# Patient Record
Sex: MALE | Race: WHITE | Employment: FULL TIME | URBAN - METROPOLITAN AREA
[De-identification: names, ages, dates, MRNs, and addresses within clinical notes are randomized per-mention and may not be internally consistent; named-entity substitution may affect disease eponyms.]

---

## 2017-05-26 ENCOUNTER — OFFICE VISIT (OUTPATIENT)
Dept: ORTHOPEDIC SURGERY | Age: 57
End: 2017-05-26

## 2017-05-26 VITALS
RESPIRATION RATE: 18 BRPM | DIASTOLIC BLOOD PRESSURE: 76 MMHG | WEIGHT: 179.2 LBS | TEMPERATURE: 98.2 F | BODY MASS INDEX: 26.46 KG/M2 | SYSTOLIC BLOOD PRESSURE: 111 MMHG

## 2017-05-26 DIAGNOSIS — Z98.1 S/P LUMBAR FUSION: ICD-10-CM

## 2017-05-26 DIAGNOSIS — M51.26 HNP (HERNIATED NUCLEUS PULPOSUS), LUMBAR: Primary | ICD-10-CM

## 2017-05-26 RX ORDER — DULOXETIN HYDROCHLORIDE 60 MG/1
CAPSULE, DELAYED RELEASE ORAL
Refills: 1 | COMMUNITY
Start: 2017-05-03

## 2017-05-26 RX ORDER — ATORVASTATIN CALCIUM 20 MG/1
TABLET, FILM COATED ORAL
Refills: 5 | COMMUNITY
Start: 2017-04-24 | End: 2022-08-04

## 2017-05-26 RX ORDER — DULOXETIN HYDROCHLORIDE 30 MG/1
CAPSULE, DELAYED RELEASE ORAL
Refills: 1 | COMMUNITY
Start: 2017-05-02 | End: 2021-06-07

## 2017-05-26 RX ORDER — TRAZODONE HYDROCHLORIDE 100 MG/1
50 TABLET ORAL
COMMUNITY
Start: 2017-05-24

## 2017-05-26 RX ORDER — ONDANSETRON 4 MG/1
TABLET, ORALLY DISINTEGRATING ORAL
Refills: 0 | COMMUNITY
Start: 2017-04-10 | End: 2022-08-04

## 2017-05-26 RX ORDER — METRONIDAZOLE 250 MG/1
TABLET ORAL
Refills: 0 | COMMUNITY
Start: 2017-04-12 | End: 2022-08-04

## 2017-05-26 RX ORDER — CIPROFLOXACIN 500 MG/1
TABLET ORAL
Refills: 0 | COMMUNITY
Start: 2017-04-12 | End: 2021-06-07

## 2017-05-26 NOTE — PROGRESS NOTES
[address]  [address], [address]  [place], [address] [place] [address] Street  Phone: [phone]  Fax: [phone]  PROGRESS NOTE  Patient: Ellis MD Saba                MRN: [id number]       SSN: [id number]  YOB: 1960        AGE: 64 y.o. SEX: male  Body mass index is 26.46 kg/(m^2). 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 He is one year out from his L4-5 fusion. He is doing quite well. He rates his pain as a 0/10. He is back working full time. His physical exam demonstrates normal strength, sensation, and reflexes. He is neurologically intact. X-rays demonstrate an L4-5 fusion. It appears to be confluent without motion. He is quite pleased with his outcome. He is going to go more full time into his martial arts activities. ASSESSMENT/PLAN: Right now, he is exercising twice a week, primarily core. I will see him back again as-needed. I have explained to him the plusses and minuses of martial arts exercise. As long as he maintains a solid core, I do not have much issue with it, but I would stay away from fighting activities and things of that sort, the key being core stability and peripheral flexibility. cc: Darion Flores M.D.          Past Medical History:   Diagnosis Date    ADD (attention deficit disorder)     Anxiety     Hypercholesteremia        Family History   Problem Relation Age of Onset    No Known Problems Mother     Diabetes Father     No Known Problems Sister     Hypertension Brother     High Cholesterol Brother     No Known Problems Brother        Current Outpatient Prescriptions   Medication Sig Dispense Refill    atorvastatin (LIPITOR) 20 mg tablet TK 1 T PO HS  5    DULoxetine (CYMBALTA) 60 mg capsule TK ONE C PO QD WITH 30MG  1    DULoxetine (CYMBALTA) 30 mg capsule TK ONE C PO QD WITH 60MG  1    VENTOLIN HFA 90 mcg/actuation inhaler       ciprofloxacin HCl (CIPRO) 500 mg tablet TK 1 T PO BID FOR 7 DAYS  0    metroNIDAZOLE (FLAGYL) 250 mg tablet TK 1 T PO TID FOR 10 DAYS  0    ondansetron (ZOFRAN ODT) 4 mg disintegrating tablet DIS 1 T ON THE TONGUE Q 6 H PRF NAUSEA  0    traZODone (DESYREL) 100 mg tablet       atorvastatin (LIPITOR) 10 mg tablet Take  by mouth daily.  fluticasone (CUTIVATE) 0.05 % topical cream TERESE A PEA-SIZED AMOUNT AA OF THE FACE D FOR 2 WEEKS  2    citalopram (CELEXA) 20 mg tablet TK 1 T PO QD  5    DULoxetine (CYMBALTA) 20 mg capsule Take 60 mg by mouth daily.  ALPRAZolam (XANAX) 0.5 mg tablet Take 0.5 mg by mouth nightly as needed. 0    AMPHETAMINE SALT COMBO 20 mg tablet Take 20 mg by mouth.  0    oxyCODONE-acetaminophen (PERCOCET 10)  mg per tablet Take 1 Tab by mouth every eight (8) hours as needed for Pain. Max Daily Amount: 3 Tabs. Indications: PAIN 90 Tab 0    HYDROmorphone (DILAUDID) 4 mg tablet Take 1 Tab by mouth every six (6) hours as needed for Pain. Max Daily Amount: 16 mg. 60 Tab 0    oxyCODONE-acetaminophen (PERCOCET 10)  mg per tablet Take 1 Tab by mouth every six (6) hours as needed. Max Daily Amount: 4 Tabs. 40 Tab 0    gabapentin (NEURONTIN) 300 mg capsule Take 1 Cap by mouth three (3) times daily. Indications: NEUROPATHIC PAIN 90 Cap 3    SUMAtriptan (IMITREX) 50 mg tablet Take 50 mg by mouth once as needed.  CLARITIN-D 12 HOUR 5-120 mg per tablet Take 1 Tab by mouth daily. 3    VYVANSE 40 mg capsule Take 80 mg by mouth daily. 0    FLUoxetine (PROZAC) 20 mg tablet Take 20 mg by mouth daily. 6    DOC-Q-LACE 100 mg capsule Take 100 mg by mouth.  0    Cyclobenzaprine 7.5 mg tablet Take 7.5 mg by mouth.  0    MIRVASO 0.33 % gel   0    simvastatin (ZOCOR) 40 mg tablet Take 40 mg by mouth nightly.          No Known Allergies    Past Surgical History:   Procedure Laterality Date    ABDOMEN SURGERY PROC UNLISTED      HX CARPAL TUNNEL RELEASE      bilateral    HX HERNIA REPAIR  1996    inguinal hernia repair    HX LUMBAR DISKECTOMY  10/19/15    gregg,     HX LUMBAR LAMINECTOMY  10/19/15    JACQUELINE DUDLEY    HX LUMBAR LAMINECTOMY  05/25/2016    HX MOHS PROCEDURES      bilateral 2x on the right, 1x on the left    SINUS SURGERY PROC UNLISTED  1998       Past Medical History:   Diagnosis Date    ADD (attention deficit disorder)     Anxiety     Hypercholesteremia        Social History     Social History    Marital status:      Spouse name: N/A    Number of children: N/A    Years of education: N/A     Occupational History    ENT Physician      [employer]     Social History Main Topics    Smoking status: Never Smoker    Smokeless tobacco: Never Used    Alcohol use No    Drug use: No    Sexual activity: Not on file     Other Topics Concern    Not on file     Social History Narrative         REVIEW OF SYSTEMS:   CONSTITUTIONAL SYMPTOMS:  Negative. EYES:  Negative. EARS, NOSE, THROAT AND MOUTH:  Negative. CARDIOVASCULAR:  Negative. RESPIRATORY:  Negative. GENITOURINARY: Negative. GASTROINTESTINAL:  Negative. INTEGUMENTARY (SKIN AND/OR BREAST):  Negative. MUSCULOSKELETAL: Per HPI.   ENDOCRINE/RHEUMATOLOGIC:  Negative. NEUROLOGICAL:  Per HPI. HEMATOLOGIC/LYMPHATIC:  Negative. ALLERGIC/IMMUNOLOGIC:  Negative. PSYCHIATRIC:  Negative. PHYSICAL EXAMINATION:   Visit Vitals    /76 (BP 1 Location: Left arm, BP Patient Position: Sitting)    Temp 98.2 °F (36.8 °C) (Oral)    Resp 18    Wt 179 lb 3.2 oz (81.3 kg)    BMI 26.46 kg/m2    PAIN SCALE: 0 - No pain/10    CONSTITUTIONAL: The patient is in no apparent distress and is alert and oriented x 3.. HEENT: Normocephalic. Hearing grossly intact. NECK: Supple and symmetric. no tenderness, or masses were felt. RESPIRATORY: No labored breathing.   CARDIOVASCULAR: The carotid pulses were normal. Peripheral pulses were 2+. CHEST: Normal AP diameter and normal contour without any kyphoscoliosis. LYMPHATIC: No lymphadenopathy was appreciated in the neck, axillae or groin. SKIN:  Negative for scars, rashes, lesions, or ulcers on the right upper, right lower, left upper, left lower and trunk. NEUROLOGICAL: Alert and oriented x 3. Ambulation without assistive device. FWB. EXTREMITIES: See musculoskeletal.  MUSCULOSKELETAL:   Head and Neck:  Negative for misalignment, asymmetry, crepitation, defects, tenderness masses or effusions.  Left Upper Extremity: Inspection, percussion and palpation preformed. Alexiss sign is negative.  Right Upper Extremity: Inspection, percussion and palpation preformed. Alexiss sign is negative.  Spine, Ribs and Pelvis: Inspection, percussion and palpation preformed. Negative for misalignment, asymmetry, crepitation, defects, tenderness masses or effusions.  Left Lower Extremity: Inspection, percussion and palpation preformed. Negative straight leg raise.  Right Lower Extremity: Inspection, percussion and palpation preformed. Negative straight leg raise. SPINE EXAM:     Lumbar spine: No rash, ecchymosis, or gross obliquity. No focal atrophy is noted. ASSESSMENT    ICD-10-CM ICD-9-CM    1. HNP (herniated nucleus pulposus), lumbar M51.26 722.10 AMB POC XRAY, SPINE, LUMBOSACRAL; 2 O   2. S/P lumbar fusion Z98.1 V45.4        Written by John Bolden, as dictated by Tommye Merlin, MD.    I, Dr. Tommye Merlin, MD, confirm that all documentation is accurate.

## 2017-05-26 NOTE — PATIENT INSTRUCTIONS
Getting Back to Normal After Low Back Pain: Care Instructions  Your Care Instructions  Almost everyone has low back pain at some time. The good news is that most low back pain will go away in a few days or weeks with some basic self-care. Some people are afraid that doing too much may make their pain worse. In the past, people stayed in bed, thinking this would help their backs. Now doctors think that, in most cases, getting back to your normal activities is good for your back, as long as you avoid doing things that make your pain worse. Follow-up care is a key part of your treatment and safety. Be sure to make and go to all appointments, and call your doctor if you are having problems. It's also a good idea to know your test results and keep a list of the medicines you take. How can you care for yourself at home? Ease back into daily activities  · For the first day or two of pain, take it easy. But as soon as possible, get back to your normal daily life and activities. · Get gentle exercise, such as walking. Movement keeps your spine flexible and helps your muscles stay strong. · If you are an athlete, return to your activity carefully. Choose a low-impact option until your pain is under control. Avoid or change activities that cause pain  · Try to avoid too much bending, heavy lifting, or reaching. These movements put extra stress on your back. · In bed, try lying on your side with a pillow between your knees. Or lie on your back on the floor with a pillow under your knees. · When you sit, place a small pillow, a rolled-up towel, or a lumbar roll in the curve of your back for extra support. · Try putting one foot up on a stool or changing positions every few minutes if you have to stand still for a period of time. Pay attention to body mechanics and posture  Body mechanics are the way you use your body. Posture is the way you sit or stand. · Take extra care when you lift.  When you must lift, bend your knees and keep your back straight. Avoid twisting, and keep the load close to your body. · Stand or sit tall, with your shoulders back and your stomach pulled in to support your back. Get support when you need it  · Let people know when you need a helping hand. Get family members or friends to help out with tasks you cannot do right now. · Be honest with your doctor about how the pain affects you. · If you have had to take time off work, talk to your doctor and boss about a gradual [id number] plan. Find out if there are other ways you could do your job to avoid hurting your back again. Reduce stress  Worrying about the pain can cause you to tense the muscles in your lower back. This in turn causes more pain. Here are a few things you can do to relax your mind and your muscles:  · Take 10 to 15 minutes to sit quietly and breathe deeply. Try to focus only on your breathing. If you cannot keep thoughts away, think about things that make you feel good. · Get involved in your favorite hobby, or try something new. · Talk to a friend, read a book, or listen to your favorite music. · Find a counselor you like and trust. Talk openly and honestly about your problems. Be willing to make some changes. When should you call for help? Call 911 anytime you think you may need emergency care. For example, call if:  · You are unable to move a leg at all. Call your doctor now or seek immediate medical care if:  · You have new or worse symptoms in your legs, belly, or buttocks. Symptoms may include:  ¨ Numbness or tingling. ¨ Weakness. ¨ Pain. · You lose bladder or bowel control. Watch closely for changes in your health, and be sure to contact your doctor if:  · You are not getting better as expected. Where can you learn more? Go to http://chuck-luis eduardo.info/. Enter C908 in the search box to learn more about \"Getting Back to Normal After Low Back Pain: Care Instructions. \"  Current as of:  May 23, 2016  Content Version: 11.2  © 1142-0708 [employer]. Care instructions adapted under license by [employer] (which disclaims liability or warranty for this information). If you have questions about a medical condition or this instruction, always ask your healthcare professional. [address] any warranty or liability for your use of this information. Back Care and Preventing Injuries: Care Instructions  Your Care Instructions  You can hurt your back doing many everyday activities: lifting a heavy box, bending down to garden, exercising at the gym, and even getting out of bed. But you can keep your back strong and healthy by doing some exercises. You also can follow a few tips for sitting, sleeping, and lifting to avoid hurting your back again. Talk to your doctor before you start an exercise program. Ask for help if you want to learn more about keeping your back healthy. Follow-up care is a key part of your treatment and safety. Be sure to make and go to all appointments, and call your doctor if you are having problems. It's also a good idea to know your test results and keep a list of the medicines you take. How can you care for yourself at home? · Stay at a healthy weight to avoid strain on your lower back. · Do not smoke. Smoking increases the risk of osteoporosis, which weakens the spine. If you need help quitting, talk to your doctor about stop-smoking programs and medicines. These can increase your chances of quitting for good. · Make sure you sleep in a position that maintains your back's normal curves and on a mattress that feels comfortable. Sleep on your side with a pillow between your knees, or sleep on your back with a pillow under your knees. These positions can reduce strain on your back. · When you get out of bed, lie on your side and bend both knees. Drop your feet over the edge of the bed as you push up with both arms.  Scoot to the edge of the bed. Make sure your feet are in line with your rear end (buttocks), and then stand up. · If you must stand for a long time, put one foot on a stool, ledge, or box. Exercise to strengthen your back and other muscles  · Get at least 30 minutes of exercise on most days of the week. Walking is a good choice. You also may want to do other activities, such as running, swimming, cycling, or playing tennis or team sports. · Stretch your back muscles. Here are few exercises to try:  Catrachita Dawley on your back with your knees bent and your feet flat on the floor. Gently pull one bent knee to your chest. Put that foot back on the floor, and then pull the other knee to your chest. Hold for 15 to 30 seconds. Repeat 2 to 4 times. ¨ Do pelvic tilts. Lie on your back with your knees bent. Tighten your stomach muscles. Pull your belly button (navel) in and up toward your ribs. You should feel like your back is pressing to the floor and your hips and pelvis are slightly lifting off the floor. Hold for 6 seconds while breathing smoothly. · Keep your core muscles strong. The muscles of your back, belly (abdomen), and buttocks support your spine. ¨ Pull in your belly, and imagine pulling your navel toward your spine. Hold this for 6 seconds, then relax. Remember to keep breathing normally as you tense your muscles. ¨ Do curl-ups. Always do them with your knees bent. Keep your low back on the floor, and curl your shoulders toward your knees using a smooth, slow motion. Keep your arms folded across your chest. If this bothers your neck, try putting your hands behind your neck (not your head), with your elbows spread apart. ¨ Lie on your back with your knees bent and your feet flat on the floor. Tighten your belly muscles, and then push with your feet and raise your buttocks up a few inches. Hold this position 6 seconds as you continue to breathe normally, then lower yourself slowly to the floor. Repeat 8 to 12 times.   ¨ If you like group exercise, try Pilates or yoga. These classes have poses that strengthen the core muscles. Protect your back when you sit  · Place a small pillow, a rolled-up towel, or a lumbar roll in the curve of your back if you need extra support. · Sit in a chair that is low enough to let you place both feet flat on the floor with both knees nearly level with your hips. If your chair or desk is too high, use a foot rest to raise your knees. · When driving, keep your knees nearly level with your hips. Sit straight, and drive with both hands on the steering wheel. Your arms should be in a slightly bent position. · Try a kneeling chair, which helps tilt your hips forward. This takes pressure off your lower back. · Try sitting on an exercise ball. It can rock from side to side, which helps keep your back loose. Lift properly  · Squat down, bending at the hips and knees only. If you need to, put one knee to the floor and extend your other knee in front of you, bent at a right angle (half kneeling). · Press your chest straight forward. This helps keep your upper back straight while keeping a slight arch in your low back. · Hold the load as close to your body as possible, at the level of your navel. · Use your feet to change direction, taking small steps. · Lead with your hips as you change direction. Keep your shoulders in line with your hips as you move. Do not twist your body. · Set down your load carefully, squatting with your knees and hips only. When should you call for help? Watch closely for changes in your health, and be sure to contact your doctor if:  · You want more exercises to make your back and other core muscles stronger. Where can you learn more? Go to http://chuck-luis eduardo.info/. Enter S810 in the search box to learn more about \"Back Care and Preventing Injuries: Care Instructions. \"  Current as of: May 23, 2016  Content Version: 11.2  © 6196-4906 [employer], [facility].  Care instructions adapted under license by [employer] (which disclaims liability or warranty for this information). If you have questions about a medical condition or this instruction, always ask your healthcare professional. [address] any warranty or liability for your use of this information. Back Stretches: Exercises  Your Care Instructions  Here are some examples of exercises for stretching your back. Start each exercise slowly. Ease off the exercise if you start to have pain. Your doctor or physical therapist will tell you when you can start these exercises and which ones will work best for you. How to do the exercises  Overhead stretch    1. Stand comfortably with your feet shoulder-width apart. 2. Looking straight ahead, raise both arms over your head and reach toward the ceiling. Do not allow your head to tilt back. 3. Hold for 15 to 30 seconds, then lower your arms to your sides. 4. Repeat 2 to 4 times. Side stretch    1. Stand comfortably with your feet shoulder-width apart. 2. Raise one arm over your head, and then lean to the other side. 3. Slide your hand down your leg as you let the weight of your arm gently stretch your side muscles. Hold for 15 to 30 seconds. 4. Repeat 2 to 4 times on each side. Press-up    1. Lie on your stomach, supporting your body with your forearms. 2. Press your elbows down into the floor to raise your upper back. As you do this, relax your stomach muscles and allow your back to arch without using your back muscles. As your press up, do not let your hips or pelvis come off the floor. 3. Hold for 15 to 30 seconds, then relax. 4. Repeat 2 to 4 times. Relax and rest    1. Lie on your back with a rolled towel under your neck and a pillow under your knees. Extend your arms comfortably to your sides. 2. Relax and breathe normally. 3. Remain in this position for about 10 minutes.   4. If you can, do this 2 or 3 times each day.  Follow-up care is a key part of your treatment and safety. Be sure to make and go to all appointments, and call your doctor if you are having problems. It's also a good idea to know your test results and keep a list of the medicines you take. Where can you learn more? Go to http://chuck-luis eduardo.info/. Enter S397 in the search box to learn more about \"Back Stretches: Exercises. \"  Current as of: May 23, 2016  Content Version: 11.2  © 1655-0877 [employer]. Care instructions adapted under license by [employer] (which disclaims liability or warranty for this information). If you have questions about a medical condition or this instruction, always ask your healthcare professional. [address] any warranty or liability for your use of this information.

## 2021-06-07 ENCOUNTER — VIRTUAL VISIT (OUTPATIENT)
Dept: ORTHOPEDIC SURGERY | Age: 61
End: 2021-06-07
Payer: COMMERCIAL

## 2021-06-07 DIAGNOSIS — M25.511 ACUTE PAIN OF RIGHT SHOULDER: Primary | ICD-10-CM

## 2021-06-07 DIAGNOSIS — M25.511 RIGHT SHOULDER PAIN, UNSPECIFIED CHRONICITY: Primary | ICD-10-CM

## 2021-06-07 PROCEDURE — 99203 OFFICE O/P NEW LOW 30 MIN: CPT | Performed by: ORTHOPAEDIC SURGERY

## 2021-06-07 RX ORDER — ELETRIPTAN HYDROBROMIDE 20 MG/1
20 TABLET, FILM COATED ORAL
COMMUNITY
End: 2022-08-04

## 2021-06-07 NOTE — PROGRESS NOTES
Name: Keith Medellin    : 1960     Service Dept: [address] [place] [address] and Sports Medicine    Patient's Pharmacies:    [facility] #[id number]  [facility] [place], [address] [place] [address] AT [place] [address] [address] [address]  Phone: [phone] Fax: [phone]       Chief Complaint   Patient presents with    Shoulder Pain        There were no vitals taken for this visit. No Known Allergies   Current Outpatient Medications   Medication Sig Dispense Refill    eletriptan (RELPAX) 20 mg tablet Take 20 mg by mouth.  atorvastatin (LIPITOR) 20 mg tablet TK 1 T PO HS  5    DULoxetine (CYMBALTA) 60 mg capsule TK ONE C PO QD WITH 30MG  1    metroNIDAZOLE (FLAGYL) 250 mg tablet TK 1 T PO TID FOR 10 DAYS  0    ondansetron (ZOFRAN ODT) 4 mg disintegrating tablet DIS 1 T ON THE TONGUE Q 6 H PRF NAUSEA  0    traZODone (DESYREL) 100 mg tablet       atorvastatin (LIPITOR) 10 mg tablet Take  by mouth daily.  fluticasone (CUTIVATE) 0.05 % topical cream TERESE A PEA-SIZED AMOUNT AA OF THE FACE D FOR 2 WEEKS  2    ALPRAZolam (XANAX) 0.5 mg tablet Take 0.5 mg by mouth nightly as needed. 0    AMPHETAMINE SALT COMBO 20 mg tablet Take 20 mg by mouth.  0    oxyCODONE-acetaminophen (PERCOCET 10)  mg per tablet Take 1 Tab by mouth every eight (8) hours as needed for Pain. Max Daily Amount: 3 Tabs. Indications: PAIN 90 Tab 0    HYDROmorphone (DILAUDID) 4 mg tablet Take 1 Tab by mouth every six (6) hours as needed for Pain. Max Daily Amount: 16 mg. 60 Tab 0    oxyCODONE-acetaminophen (PERCOCET 10)  mg per tablet Take 1 Tab by mouth every six (6) hours as needed. Max Daily Amount: 4 Tabs. 40 Tab 0    gabapentin (NEURONTIN) 300 mg capsule Take 1 Cap by mouth three (3) times daily. Indications: NEUROPATHIC PAIN 90 Cap 3    VENTOLIN HFA 90 mcg/actuation inhaler       SUMAtriptan (IMITREX) 50 mg tablet Take 50 mg by mouth once as needed.       CLARITIN-D 12 HOUR 5-120 mg per tablet Take 1 Tab by mouth daily. 3    VYVANSE 40 mg capsule Take 80 mg by mouth daily. 0    FLUoxetine (PROZAC) 20 mg tablet Take 20 mg by mouth daily. 6    DOC-Q-LACE 100 mg capsule Take 100 mg by mouth.  0    MIRVASO 0.33 % gel   0    simvastatin (ZOCOR) 40 mg tablet Take 40 mg by mouth nightly. Patient Active Problem List   Diagnosis Code    HNP (herniated nucleus pulposus), lumbar M51.26    S/P lumbar laminectomy Z98.890    Swelling of lower extremity M79.89    Recurrent herniation of lumbar disc M51.26    S/P lumbar fusion Z98.1      Family History   Problem Relation Age of Onset    No Known Problems Mother     Diabetes Father     No Known Problems Sister     Hypertension Brother     High Cholesterol Brother     No Known Problems Brother       Social History     Socioeconomic History    Marital status:      Spouse name: Not on file    Number of children: Not on file    Years of education: Not on file    Highest education level: Not on file   Occupational History    Occupation: ENT Physician     Comment: [employer]   Tobacco Use    Smoking status: Never Smoker    Smokeless tobacco: Never Used   Substance and Sexual Activity    Alcohol use: No    Drug use: No     Social Determinants of Health     Financial Resource Strain:     Difficulty of Paying Living Expenses:    Food Insecurity:     Worried About Running Out of Food in the Last Year:     Ran Out of Food in the Last Year:    Transportation Needs:     Lack of Transportation (Medical):      Lack of Transportation (Non-Medical):    Physical Activity:     Days of Exercise per Week:     Minutes of Exercise per Session:    Stress:     Feeling of Stress :    Social Connections:     Frequency of Communication with Friends and Family:     Frequency of Social Gatherings with Friends and Family:     Attends Denominational Services:     Active Member of Clubs or Organizations:     Attends Club or Organization Meetings:     Marital Status:       Past Surgical History:   Procedure Laterality Date    HX CARPAL TUNNEL RELEASE      bilateral    HX HERNIA REPAIR  1996    inguinal hernia repair    HX LUMBAR DISKECTOMY  10/19/15    partial,     HX LUMBAR LAMINECTOMY  10/19/15    PARTIAL,JACQUELINE    HX LUMBAR LAMINECTOMY  05/25/2016    HX MOHS PROCEDURES      bilateral 2x on the right, 1x on the left    LA ABDOMEN SURGERY PROC UNLISTED      LA SINUS SURGERY PROC UNLISTED  1998      Past Medical History:   Diagnosis Date    ADD (attention deficit disorder)     Anxiety     Hypercholesteremia         I have reviewed and agree with [address] [place] [address] Nw and ROS and intake form in chart and the record furthermore I have reviewed prior medical record(s) regarding this patients care during this appointment. Review of Systems:   Patient is a pleasant appearing individual, appropriately dressed, well hydrated, well nourished, who is alert, appropriately oriented for age, and in no acute distress with a normal gait and normal affect who does not appear to be in any significant pain. Physical Exam:  Right Shoulder - Positive \"empty can\" test, Grossly neurovascularly intact. Range of motion-Full passive, Active with impingement. No Point tenderness, Strength-significant weakness noted with abduction, some mild crepitation, No skin lesion are identified, No instabilty is noted, No apprehension. No Swelling. Left Shoulder - grossly neurovascularly intact. Full Range of motion, No Weakness with abduction, No Point Tenderness, No skin lesion are identified, No instabilty is noted, No apprehension. No cuts or abrasions are identified. Siena Mendoza, who was evaluated through a synchronous (real-time) audio-video encounter, and/or his healthcare decision maker, is aware that it is a billable service, with coverage as determined by his insurance carrier.  He provided verbal consent to proceed: Yes, and patient identification was verified. This visit was conducted pursuant to the emergency declaration under the [facility], [address] [place] [address] and the [employer] and Major [employer] General Act. A caregiver was present when appropriate. Ability to conduct physical exam was limited. The patient was located in a state where the provider was credentialed to provide care. --Shelly Davidson MD on 6/8/2021 at 2:27 PM     Encounter Diagnoses     ICD-10-CM ICD-9-CM   1. Right shoulder pain, unspecified chronicity  M25.511 719.41       HPI:  The patient is here with a chief complaint of right shoulder pain, throbbing burning pain, progressively getting worse. Pain is 5/10. He injured in a tug of war. Assessment/Plan:  Plan at this point, I would like to go ahead and get an MRI since he has got a history of two arthroscopic surgeries on that side. I will see him back post-MRI to rule out a cuff tear and go from there. As part of continued conservative pain management options the patient was advised to utilize Tylenol or OTC NSAIDS as long as it is not medically contraindicated. Return to Office: Follow-up and Dispositions    · Return for POST MRI. Scribed by Yoly Tam LPN as dictated by [facility] BRAVO Clark MD.  Documentation True and Accepted Miguelangel Clark MD

## 2021-06-07 NOTE — PATIENT INSTRUCTIONS
Shoulder Pain: Care Instructions Your Care Instructions You can hurt your shoulder by using it too much during an activity, such as fishing or baseball. It can also happen as part of the everyday wear and tear of getting older. Shoulder injuries can be slow to heal, but your shoulder should get better with time. Your doctor may recommend a sling to rest your shoulder. If you have injured your shoulder, you may need testing and treatment. Follow-up care is a key part of your treatment and safety. Be sure to make and go to all appointments, and call your doctor if you are having problems. It's also a good idea to know your test results and keep a list of the medicines you take. How can you care for yourself at home? · Take pain medicines exactly as directed. ? If the doctor gave you a prescription medicine for pain, take it as prescribed. ? If you are not taking a prescription pain medicine, ask your doctor if you can take an over-the-counter medicine. ? Do not take two or more pain medicines at the same time unless the doctor told you to. Many pain medicines contain acetaminophen, which is Tylenol. Too much acetaminophen (Tylenol) can be harmful. · If your doctor recommends that you wear a sling, use it as directed. Do not take it off before your doctor tells you to. · Put ice or a cold pack on the sore area for 10 to 20 minutes at a time. Put a thin cloth between the ice and your skin. · If there is no swelling, you can put moist heat, a heating pad, or a warm cloth on your shoulder. Some doctors suggest alternating between hot and cold. · Rest your shoulder for a few days. If your doctor recommends it, you can then begin gentle exercise of the shoulder, but do not lift anything heavy. When should you call for help? Call 911 anytime you think you may need emergency care. For example, call if: 
  · You have chest pain or pressure. This may occur with: ? Sweating. ? Shortness of breath.  
? Nausea or vomiting. ? Pain that spreads from the chest to the neck, jaw, or one or both shoulders or arms. ? Dizziness or lightheadedness. ? A fast or uneven pulse. After calling 911, chew 1 adult-strength aspirin. Wait for an ambulance. Do not try to drive yourself.  
  · Your arm or hand is cool or pale or changes color. Call your doctor now or seek immediate medical care if: 
  · You have signs of infection, such as: 
? Increased pain, swelling, warmth, or redness in your shoulder. ? Red streaks leading from a place on your shoulder. ? Pus draining from an area of your shoulder. ? Swollen lymph nodes in your neck, armpits, or groin. ? A fever. Watch closely for changes in your health, and be sure to contact your doctor if: 
  · You cannot use your shoulder.  
  · Your shoulder does not get better as expected. Where can you learn more? Go to http://www.gray.com/ Enter W288 in the search box to learn more about \"Shoulder Pain: Care Instructions. \" Current as of: November 16, 2020               Content Version: 12.8 © 2697-5366 [employer]. Care instructions adapted under license by [employer] (which disclaims liability or warranty for this information). If you have questions about a medical condition or this instruction, always ask your healthcare professional. [place] [address] any warranty or liability for your use of this information.

## 2021-06-09 DIAGNOSIS — M25.511 ACUTE PAIN OF RIGHT SHOULDER: Primary | ICD-10-CM

## 2021-06-11 ENCOUNTER — HOSPITAL ENCOUNTER (OUTPATIENT)
Dept: MRI IMAGING | Age: 61
Discharge: HOME | End: 2021-06-11
Attending: ORTHOPAEDIC SURGERY
Payer: COMMERCIAL

## 2021-06-11 DIAGNOSIS — M25.511 ACUTE PAIN OF RIGHT SHOULDER: ICD-10-CM

## 2021-06-11 PROCEDURE — 73221 MRI JOINT UPR EXTREM W/O DYE: CPT

## 2021-11-12 ENCOUNTER — TRANSCRIBE ORDER (OUTPATIENT)
Dept: SCHEDULING | Age: 61
End: 2021-11-12

## 2021-11-12 DIAGNOSIS — N50.89 TESTICULAR MASS: Primary | ICD-10-CM

## 2021-11-17 ENCOUNTER — HOSPITAL ENCOUNTER (OUTPATIENT)
Dept: ULTRASOUND IMAGING | Age: 61
Discharge: HOME | End: 2021-11-17
Payer: COMMERCIAL

## 2021-11-17 DIAGNOSIS — N50.89 TESTICULAR MASS: ICD-10-CM

## 2021-11-17 PROCEDURE — 93975 VASCULAR STUDY: CPT

## 2021-11-17 PROCEDURE — 76870 US EXAM SCROTUM: CPT

## 2022-01-19 ENCOUNTER — TRANSCRIBE ORDER (OUTPATIENT)
Dept: SCHEDULING | Age: 62
End: 2022-01-19

## 2022-01-19 DIAGNOSIS — H90.A22 SENSORINEURAL HEARING LOSS, UNILATERAL, LEFT EAR, WITH RESTRICTED HEARING ON THE CONTRALATERAL SIDE: Primary | ICD-10-CM

## 2022-01-19 DIAGNOSIS — M75.121 COMPLETE TEAR OF RIGHT ROTATOR CUFF: Primary | ICD-10-CM

## 2022-01-21 ENCOUNTER — TRANSCRIBE ORDER (OUTPATIENT)
Dept: REGISTRATION | Age: 62
End: 2022-01-21

## 2022-01-21 DIAGNOSIS — M75.121 COMPLETE TEAR OF RIGHT ROTATOR CUFF: Primary | ICD-10-CM

## 2022-01-31 ENCOUNTER — HOSPITAL ENCOUNTER (OUTPATIENT)
Dept: MRI IMAGING | Age: 62
Discharge: HOME | End: 2022-01-31
Payer: COMMERCIAL

## 2022-01-31 DIAGNOSIS — M75.121 COMPLETE TEAR OF RIGHT ROTATOR CUFF: ICD-10-CM

## 2022-01-31 PROCEDURE — 73221 MRI JOINT UPR EXTREM W/O DYE: CPT

## 2022-02-04 ENCOUNTER — TRANSCRIBE ORDER (OUTPATIENT)
Dept: SCHEDULING | Age: 62
End: 2022-02-04

## 2022-02-04 DIAGNOSIS — H90.A22 SENSORINEURAL HEARING LOSS, UNILATERAL, LEFT EAR, WITH RESTRICTED HEARING ON THE CONTRALATERAL SIDE: Primary | ICD-10-CM

## 2022-02-20 ENCOUNTER — HOSPITAL ENCOUNTER (OUTPATIENT)
Age: 62
Discharge: HOME | End: 2022-02-20
Payer: COMMERCIAL

## 2022-02-20 DIAGNOSIS — H90.A22 SENSORINEURAL HEARING LOSS, UNILATERAL, LEFT EAR, WITH RESTRICTED HEARING ON THE CONTRALATERAL SIDE: ICD-10-CM

## 2022-02-20 PROCEDURE — 74011250636 HC RX REV CODE- 250/636

## 2022-02-20 PROCEDURE — 82565 ASSAY OF CREATININE: CPT

## 2022-02-20 PROCEDURE — A9575 INJ GADOTERATE MEGLUMI 0.1ML: HCPCS

## 2022-02-20 PROCEDURE — 70553 MRI BRAIN STEM W/O & W/DYE: CPT

## 2022-02-20 RX ADMIN — GADOTERATE MEGLUMINE 19 ML: 376.9 INJECTION INTRAVENOUS at 14:58

## 2022-02-21 LAB — CREAT UR-MCNC: 1.1 MG/DL (ref 0.6–1.3)

## 2022-03-01 ENCOUNTER — HOSPITAL ENCOUNTER (OUTPATIENT)
Dept: VASCULAR SURGERY | Age: 62
Discharge: HOME | End: 2022-03-01
Attending: FAMILY MEDICINE
Payer: COMMERCIAL

## 2022-03-01 ENCOUNTER — TRANSCRIBE ORDER (OUTPATIENT)
Dept: SCHEDULING | Age: 62
End: 2022-03-01

## 2022-03-01 DIAGNOSIS — R22.32 LOCALIZED SWELLING, MASS AND LUMP, UPPER LIMB, LEFT: Primary | ICD-10-CM

## 2022-03-01 DIAGNOSIS — R22.32 LOCALIZED SWELLING, MASS AND LUMP, UPPER LIMB, LEFT: ICD-10-CM

## 2022-03-01 PROCEDURE — 93971 EXTREMITY STUDY: CPT

## 2022-04-12 ENCOUNTER — HOSPITAL ENCOUNTER (OUTPATIENT)
Dept: GENERAL RADIOLOGY | Age: 62
Discharge: HOME | End: 2022-04-12
Payer: COMMERCIAL

## 2022-04-12 ENCOUNTER — TRANSCRIBE ORDER (OUTPATIENT)
Dept: SCHEDULING | Age: 62
End: 2022-04-12

## 2022-04-12 DIAGNOSIS — M54.50 LOW BACK PAIN: ICD-10-CM

## 2022-04-12 DIAGNOSIS — M54.16 LUMBAR RADICULOPATHY: ICD-10-CM

## 2022-04-12 DIAGNOSIS — M54.50 LOW BACK PAIN: Primary | ICD-10-CM

## 2022-04-12 PROCEDURE — 72110 X-RAY EXAM L-2 SPINE 4/>VWS: CPT

## 2022-04-22 ENCOUNTER — HOSPITAL ENCOUNTER (OUTPATIENT)
Dept: MRI IMAGING | Age: 62
End: 2022-04-22
Attending: PHYSICIAN ASSISTANT

## 2022-08-04 RX ORDER — TESTOSTERONE CYPIONATE 200 MG/ML
INJECTION INTRAMUSCULAR
COMMUNITY

## 2022-08-04 RX ORDER — ATORVASTATIN CALCIUM 20 MG/1
20 TABLET, FILM COATED ORAL DAILY
COMMUNITY

## 2022-08-04 RX ORDER — DULOXETIN HYDROCHLORIDE 30 MG/1
30 CAPSULE, DELAYED RELEASE ORAL DAILY
COMMUNITY

## 2022-08-04 RX ORDER — NAPROXEN 500 MG/1
500 TABLET, DELAYED RELEASE ORAL 2 TIMES DAILY WITH MEALS
COMMUNITY

## 2022-08-04 NOTE — PERIOP NOTES
PRE-SURGICAL INSTRUCTIONS        Patient's Name:  Marcela Sandoval      CQWAQ'G Date:  8/4/2022            Covid Testing Date and Time: vaccinated and bringing card    Surgery Date:  8/8/2022                Do NOT eat or drink anything, including candy, gum, or ice chips after midnight on 8/8, unless you have specific instructions from your surgeon or anesthesia provider to do so. You may brush your teeth before coming to the hospital.  No smoking 24 hours prior to the day of surgery. No alcohol 24 hours prior to the day of surgery. No recreational drugs for one week prior to the day of surgery. Leave all valuables, including money/purse, at home. Remove all jewelry, nail polish, acrylic nails, and makeup (including mascara); no lotions powders, deodorant, or perfume/cologne/after shave on the skin. Follow instruction for Hibiclens washes and CHG wipes from surgeon's office. Glasses/contact lenses and dentures may be worn to the hospital.  They will be removed prior to surgery. Call your doctor if symptoms of a cold or illness develop within 24-48 hours prior to your surgery. 11.  If you are having an outpatient procedure, please make arrangements for a responsible ADULT TO [address] [place] [address] and stay with you for 24 hours after your surgery. 12. ONE VISITOR in the hospital at this time for outpatient procedures. Exceptions may be made for surgical admissions, per nursing unit guidelines      Special Instructions:      Bring list of CURRENT medications. Bring inhaler. Bring any pertinent legal medical records. Take these medications the morning of surgery with a sip of water:  as directed by MD  Follow physician instructions about stopping anticoagulants. On the day of surgery, come in the main entrance of [facility]". Let the  at the desk know you are there for surgery. A staff member will come escort you to the surgical area on the second floor.     If you have any questions or concerns, please do not hesitate to call:     (Prior to the day of surgery) [facility] department:  [phone]   (Day of surgery) Pre-Op department:  [phone]    These surgical instructions were reviewed with Dr. Compa Dugan during the [facility] phone call.

## 2022-08-05 ENCOUNTER — ANESTHESIA EVENT (OUTPATIENT)
Dept: SURGERY | Age: 62
DRG: 455 | End: 2022-08-05
Payer: COMMERCIAL

## 2022-08-05 NOTE — NURSE NAVIGATOR
Patient scheduled for LEFT L3/4 TRANSFORAMINAL LUMBAR INTERBODY FUSION (TLIF)/REVISION  with Dr. Jason Stevens on 08/05/2022. Unable to reach patient, Vm left to return moe to ortho coordinator. Call placed to Dr. Jason Stevens surgery scheduler. Per Karoline patient is aware of what time to show up for surgery. Unable to provide surgical education .

## 2022-08-08 ENCOUNTER — ANESTHESIA (OUTPATIENT)
Dept: SURGERY | Age: 62
DRG: 455 | End: 2022-08-08
Payer: COMMERCIAL

## 2022-08-08 ENCOUNTER — APPOINTMENT (OUTPATIENT)
Dept: GENERAL RADIOLOGY | Age: 62
DRG: 455 | End: 2022-08-08
Attending: ORTHOPAEDIC SURGERY
Payer: COMMERCIAL

## 2022-08-08 ENCOUNTER — HOSPITAL ENCOUNTER (INPATIENT)
Age: 62
LOS: 1 days | Discharge: HOME HEALTH | DRG: 455 | End: 2022-08-09
Attending: ORTHOPAEDIC SURGERY | Admitting: ORTHOPAEDIC SURGERY
Payer: COMMERCIAL

## 2022-08-08 DIAGNOSIS — Z98.1 S/P LUMBAR FUSION: Primary | ICD-10-CM

## 2022-08-08 PROBLEM — M48.061 LUMBAR STENOSIS: Status: ACTIVE | Noted: 2022-08-08

## 2022-08-08 LAB
ABO + RH BLD: NORMAL
ANION GAP SERPL CALC-SCNC: 6 MMOL/L (ref 3–18)
BLOOD GROUP ANTIBODIES SERPL: NORMAL
BUN SERPL-MCNC: 14 MG/DL (ref 7–18)
BUN/CREAT SERPL: 13 (ref 12–20)
CALCIUM SERPL-MCNC: 9.1 MG/DL (ref 8.5–10.1)
CHLORIDE SERPL-SCNC: 109 MMOL/L (ref 100–111)
CO2 SERPL-SCNC: 26 MMOL/L (ref 21–32)
CREAT SERPL-MCNC: 1.11 MG/DL (ref 0.6–1.3)
ERYTHROCYTE [DISTWIDTH] IN BLOOD BY AUTOMATED COUNT: 12.9 % (ref 11.6–14.5)
GLUCOSE SERPL-MCNC: 92 MG/DL (ref 74–99)
HCT VFR BLD AUTO: 45.5 % (ref 36–48)
HGB BLD-MCNC: 15.2 G/DL (ref 13–16)
MCH RBC QN AUTO: 31.3 PG (ref 24–34)
MCHC RBC AUTO-ENTMCNC: 33.4 G/DL (ref 31–37)
MCV RBC AUTO: 93.6 FL (ref 78–100)
NRBC # BLD: 0 K/UL (ref 0–0.01)
NRBC BLD-RTO: 0 PER 100 WBC
PLATELET # BLD AUTO: 270 K/UL (ref 135–420)
PMV BLD AUTO: 9.3 FL (ref 9.2–11.8)
POTASSIUM SERPL-SCNC: 4.5 MMOL/L (ref 3.5–5.5)
RBC # BLD AUTO: 4.86 M/UL (ref 4.35–5.65)
SODIUM SERPL-SCNC: 141 MMOL/L (ref 136–145)
SPECIMEN EXP DATE BLD: NORMAL
WBC # BLD AUTO: 5.5 K/UL (ref 4.6–13.2)

## 2022-08-08 PROCEDURE — C1821 INTERSPINOUS IMPLANT: HCPCS | Performed by: ORTHOPAEDIC SURGERY

## 2022-08-08 PROCEDURE — 74011000250 HC RX REV CODE- 250: Performed by: NURSE ANESTHETIST, CERTIFIED REGISTERED

## 2022-08-08 PROCEDURE — 74011250636 HC RX REV CODE- 250/636: Performed by: NURSE ANESTHETIST, CERTIFIED REGISTERED

## 2022-08-08 PROCEDURE — 01NB0ZZ RELEASE LUMBAR NERVE, OPEN APPROACH: ICD-10-PCS | Performed by: ORTHOPAEDIC SURGERY

## 2022-08-08 PROCEDURE — 77030040361 HC SLV COMPR DVT MDII -B: Performed by: ORTHOPAEDIC SURGERY

## 2022-08-08 PROCEDURE — C1713 ANCHOR/SCREW BN/BN,TIS/BN: HCPCS | Performed by: ORTHOPAEDIC SURGERY

## 2022-08-08 PROCEDURE — 65270000029 HC RM PRIVATE

## 2022-08-08 PROCEDURE — 74011250636 HC RX REV CODE- 250/636: Performed by: ORTHOPAEDIC SURGERY

## 2022-08-08 PROCEDURE — 77030034475 HC MISC IMPL SPN: Performed by: ORTHOPAEDIC SURGERY

## 2022-08-08 PROCEDURE — 77030018836 HC SOL IRR NACL ICUM -A: Performed by: ORTHOPAEDIC SURGERY

## 2022-08-08 PROCEDURE — 74011250637 HC RX REV CODE- 250/637: Performed by: NURSE ANESTHETIST, CERTIFIED REGISTERED

## 2022-08-08 PROCEDURE — 77030040356 HC CORD BPLR FRCP COVD -A: Performed by: ORTHOPAEDIC SURGERY

## 2022-08-08 PROCEDURE — 77030003029 HC SUT VCRL J&J -B: Performed by: ORTHOPAEDIC SURGERY

## 2022-08-08 PROCEDURE — 2709999900 HC NON-CHARGEABLE SUPPLY: Performed by: ORTHOPAEDIC SURGERY

## 2022-08-08 PROCEDURE — 76010000131 HC OR TIME 2 TO 2.5 HR: Performed by: ORTHOPAEDIC SURGERY

## 2022-08-08 PROCEDURE — 74011250637 HC RX REV CODE- 250/637: Performed by: ORTHOPAEDIC SURGERY

## 2022-08-08 PROCEDURE — 77030013567 HC DRN WND RESERV BARD -A: Performed by: ORTHOPAEDIC SURGERY

## 2022-08-08 PROCEDURE — 77030013079 HC BLNKT BAIR HGGR 3M -A: Performed by: ANESTHESIOLOGY

## 2022-08-08 PROCEDURE — 74011000250 HC RX REV CODE- 250: Performed by: ORTHOPAEDIC SURGERY

## 2022-08-08 PROCEDURE — 97161 PT EVAL LOW COMPLEX 20 MIN: CPT

## 2022-08-08 PROCEDURE — 00670 ANES XTNSV SP&SPI CORD PX: CPT | Performed by: ANESTHESIOLOGY

## 2022-08-08 PROCEDURE — 2709999900 HC NON-CHARGEABLE SUPPLY

## 2022-08-08 PROCEDURE — 0SG1071 FUSION OF 2 OR MORE LUMBAR VERTEBRAL JOINTS WITH AUTOLOGOUS TISSUE SUBSTITUTE, POSTERIOR APPROACH, POSTERIOR COLUMN, OPEN APPROACH: ICD-10-PCS | Performed by: ORTHOPAEDIC SURGERY

## 2022-08-08 PROCEDURE — 77030008683 HC TU ET CUF COVD -A: Performed by: ANESTHESIOLOGY

## 2022-08-08 PROCEDURE — 0ST20ZZ RESECTION OF LUMBAR VERTEBRAL DISC, OPEN APPROACH: ICD-10-PCS | Performed by: ORTHOPAEDIC SURGERY

## 2022-08-08 PROCEDURE — 0SG00AJ FUSION OF LUMBAR VERTEBRAL JOINT WITH INTERBODY FUSION DEVICE, POSTERIOR APPROACH, ANTERIOR COLUMN, OPEN APPROACH: ICD-10-PCS | Performed by: ORTHOPAEDIC SURGERY

## 2022-08-08 PROCEDURE — 85027 COMPLETE CBC AUTOMATED: CPT

## 2022-08-08 PROCEDURE — 77030039267 HC ADH SKN EXOFIN S2SG -B: Performed by: ORTHOPAEDIC SURGERY

## 2022-08-08 PROCEDURE — 76210000000 HC OR PH I REC 2 TO 2.5 HR: Performed by: ORTHOPAEDIC SURGERY

## 2022-08-08 PROCEDURE — 77030035236 HC SUT PDS STRATFX BARB J&J -B: Performed by: ORTHOPAEDIC SURGERY

## 2022-08-08 PROCEDURE — 77030011265 HC ELECTRD BLD HEX COVD -A: Performed by: ORTHOPAEDIC SURGERY

## 2022-08-08 PROCEDURE — 86900 BLOOD TYPING SEROLOGIC ABO: CPT

## 2022-08-08 PROCEDURE — 77030004402 HC BUR NEUR STRY -C: Performed by: ORTHOPAEDIC SURGERY

## 2022-08-08 PROCEDURE — 77030012890: Performed by: ORTHOPAEDIC SURGERY

## 2022-08-08 PROCEDURE — 77030025884 HC SPNG HEMSTAT GEL PHAR -B: Performed by: ORTHOPAEDIC SURGERY

## 2022-08-08 PROCEDURE — 77030027138 HC INCENT SPIROMETER -A: Performed by: ORTHOPAEDIC SURGERY

## 2022-08-08 PROCEDURE — 77030040922 HC BLNKT HYPOTHRM STRY -A: Performed by: ORTHOPAEDIC SURGERY

## 2022-08-08 PROCEDURE — 77030030163 HC BN WAX J&J -A: Performed by: ORTHOPAEDIC SURGERY

## 2022-08-08 PROCEDURE — 77030040830 HC CATH URETH FOL MDII -A: Performed by: ORTHOPAEDIC SURGERY

## 2022-08-08 PROCEDURE — 74011000258 HC RX REV CODE- 258: Performed by: ORTHOPAEDIC SURGERY

## 2022-08-08 PROCEDURE — 77030026438 HC STYL ET INTUB CARD -A: Performed by: ANESTHESIOLOGY

## 2022-08-08 PROCEDURE — 74011000272 HC RX REV CODE- 272: Performed by: ORTHOPAEDIC SURGERY

## 2022-08-08 PROCEDURE — 77030033138 HC SUT PGA STRATFX J&J -B: Performed by: ORTHOPAEDIC SURGERY

## 2022-08-08 PROCEDURE — 80048 BASIC METABOLIC PNL TOTAL CA: CPT

## 2022-08-08 PROCEDURE — 77030012406 HC DRN WND PENRS BARD -A: Performed by: ORTHOPAEDIC SURGERY

## 2022-08-08 PROCEDURE — 76060000035 HC ANESTHESIA 2 TO 2.5 HR: Performed by: ORTHOPAEDIC SURGERY

## 2022-08-08 PROCEDURE — 97116 GAIT TRAINING THERAPY: CPT

## 2022-08-08 DEVICE — SPACER SPNL 7 DEG SM 28X12 MM STRL PROLIFT: Type: IMPLANTABLE DEVICE | Site: SPINE LUMBAR | Status: FUNCTIONAL

## 2022-08-08 DEVICE — IMPLANTABLE DEVICE: Type: IMPLANTABLE DEVICE | Site: SPINE LUMBAR | Status: FUNCTIONAL

## 2022-08-08 DEVICE — BONE GRAFT DELIVERY DEVICE
Type: IMPLANTABLE DEVICE | Site: SPINE LUMBAR | Status: FUNCTIONAL
Brand: BI-PORTAL BONE GRAFT DELIVERY DEVICE

## 2022-08-08 DEVICE — I-FACTOR™ PUTTY, 5.0 CC SYRINGE
Type: IMPLANTABLE DEVICE | Site: SPINE LUMBAR | Status: FUNCTIONAL
Brand: I-FACTOR™ PEPTIDE ENHANCED BONE GRAFT

## 2022-08-08 DEVICE — SET SCR SPNL POLYAX ATR EVEREST: Type: IMPLANTABLE DEVICE | Site: SPINE LUMBAR | Status: FUNCTIONAL

## 2022-08-08 DEVICE — SCREW SPNL L45MM DIA6.5MM PEDCL POLYAX 2 LD THRD TOP LD FOR: Type: IMPLANTABLE DEVICE | Site: SPINE LUMBAR | Status: FUNCTIONAL

## 2022-08-08 RX ORDER — HYDROMORPHONE HYDROCHLORIDE 1 MG/ML
1 INJECTION, SOLUTION INTRAMUSCULAR; INTRAVENOUS; SUBCUTANEOUS
Status: DISCONTINUED | OUTPATIENT
Start: 2022-08-08 | End: 2022-08-09 | Stop reason: HOSPADM

## 2022-08-08 RX ORDER — HYDROMORPHONE HYDROCHLORIDE 1 MG/ML
0.5 INJECTION, SOLUTION INTRAMUSCULAR; INTRAVENOUS; SUBCUTANEOUS
Status: COMPLETED | OUTPATIENT
Start: 2022-08-08 | End: 2022-08-08

## 2022-08-08 RX ORDER — CETIRIZINE HCL 10 MG
5 TABLET ORAL DAILY
Status: DISCONTINUED | OUTPATIENT
Start: 2022-08-09 | End: 2022-08-09 | Stop reason: HOSPADM

## 2022-08-08 RX ORDER — SODIUM CHLORIDE, SODIUM LACTATE, POTASSIUM CHLORIDE, CALCIUM CHLORIDE 600; 310; 30; 20 MG/100ML; MG/100ML; MG/100ML; MG/100ML
50 INJECTION, SOLUTION INTRAVENOUS CONTINUOUS
Status: DISPENSED | OUTPATIENT
Start: 2022-08-08 | End: 2022-08-09

## 2022-08-08 RX ORDER — NALOXONE HYDROCHLORIDE 0.4 MG/ML
0.4 INJECTION, SOLUTION INTRAMUSCULAR; INTRAVENOUS; SUBCUTANEOUS AS NEEDED
Status: DISCONTINUED | OUTPATIENT
Start: 2022-08-08 | End: 2022-08-09 | Stop reason: HOSPADM

## 2022-08-08 RX ORDER — TAMSULOSIN HYDROCHLORIDE 0.4 MG/1
0.4 CAPSULE ORAL DAILY
Status: DISCONTINUED | OUTPATIENT
Start: 2022-08-08 | End: 2022-08-09 | Stop reason: HOSPADM

## 2022-08-08 RX ORDER — BUPIVACAINE HYDROCHLORIDE 5 MG/ML
INJECTION, SOLUTION EPIDURAL; INTRACAUDAL AS NEEDED
Status: DISCONTINUED | OUTPATIENT
Start: 2022-08-08 | End: 2022-08-08 | Stop reason: HOSPADM

## 2022-08-08 RX ORDER — NEOSTIGMINE METHYLSULFATE 1 MG/ML
INJECTION, SOLUTION INTRAVENOUS AS NEEDED
Status: DISCONTINUED | OUTPATIENT
Start: 2022-08-08 | End: 2022-08-08 | Stop reason: HOSPADM

## 2022-08-08 RX ORDER — PREGABALIN 75 MG/1
75 CAPSULE ORAL ONCE
Status: COMPLETED | OUTPATIENT
Start: 2022-08-08 | End: 2022-08-08

## 2022-08-08 RX ORDER — ROCURONIUM BROMIDE 10 MG/ML
INJECTION, SOLUTION INTRAVENOUS AS NEEDED
Status: DISCONTINUED | OUTPATIENT
Start: 2022-08-08 | End: 2022-08-08 | Stop reason: HOSPADM

## 2022-08-08 RX ORDER — ONDANSETRON 2 MG/ML
INJECTION INTRAMUSCULAR; INTRAVENOUS AS NEEDED
Status: DISCONTINUED | OUTPATIENT
Start: 2022-08-08 | End: 2022-08-08 | Stop reason: HOSPADM

## 2022-08-08 RX ORDER — LIDOCAINE HYDROCHLORIDE 20 MG/ML
INJECTION, SOLUTION EPIDURAL; INFILTRATION; INTRACAUDAL; PERINEURAL AS NEEDED
Status: DISCONTINUED | OUTPATIENT
Start: 2022-08-08 | End: 2022-08-08 | Stop reason: HOSPADM

## 2022-08-08 RX ORDER — DEXAMETHASONE SODIUM PHOSPHATE 4 MG/ML
INJECTION, SOLUTION INTRA-ARTICULAR; INTRALESIONAL; INTRAMUSCULAR; INTRAVENOUS; SOFT TISSUE AS NEEDED
Status: DISCONTINUED | OUTPATIENT
Start: 2022-08-08 | End: 2022-08-08 | Stop reason: HOSPADM

## 2022-08-08 RX ORDER — DIAZEPAM 5 MG/1
5 TABLET ORAL
Status: DISCONTINUED | OUTPATIENT
Start: 2022-08-08 | End: 2022-08-09 | Stop reason: HOSPADM

## 2022-08-08 RX ORDER — FAMOTIDINE 20 MG/1
40 TABLET, FILM COATED ORAL EVERY 12 HOURS
Status: DISCONTINUED | OUTPATIENT
Start: 2022-08-08 | End: 2022-08-09 | Stop reason: HOSPADM

## 2022-08-08 RX ORDER — LORAZEPAM 2 MG/ML
1 INJECTION, SOLUTION INTRAMUSCULAR; INTRAVENOUS
Status: DISCONTINUED | OUTPATIENT
Start: 2022-08-08 | End: 2022-08-09 | Stop reason: HOSPADM

## 2022-08-08 RX ORDER — ONDANSETRON 2 MG/ML
4 INJECTION INTRAMUSCULAR; INTRAVENOUS
Status: DISCONTINUED | OUTPATIENT
Start: 2022-08-08 | End: 2022-08-09 | Stop reason: HOSPADM

## 2022-08-08 RX ORDER — DULOXETIN HYDROCHLORIDE 60 MG/1
60 CAPSULE, DELAYED RELEASE ORAL DAILY
Status: DISCONTINUED | OUTPATIENT
Start: 2022-08-09 | End: 2022-08-09 | Stop reason: HOSPADM

## 2022-08-08 RX ORDER — SODIUM CHLORIDE, SODIUM LACTATE, POTASSIUM CHLORIDE, CALCIUM CHLORIDE 600; 310; 30; 20 MG/100ML; MG/100ML; MG/100ML; MG/100ML
50 INJECTION, SOLUTION INTRAVENOUS CONTINUOUS
Status: DISCONTINUED | OUTPATIENT
Start: 2022-08-08 | End: 2022-08-08 | Stop reason: HOSPADM

## 2022-08-08 RX ORDER — PHENYLEPHRINE HCL IN 0.9% NACL 1 MG/10 ML
SYRINGE (ML) INTRAVENOUS AS NEEDED
Status: DISCONTINUED | OUTPATIENT
Start: 2022-08-08 | End: 2022-08-08 | Stop reason: HOSPADM

## 2022-08-08 RX ORDER — OXYCODONE HYDROCHLORIDE 5 MG/1
5-10 TABLET ORAL
Status: DISCONTINUED | OUTPATIENT
Start: 2022-08-08 | End: 2022-08-09 | Stop reason: HOSPADM

## 2022-08-08 RX ORDER — DULOXETIN HYDROCHLORIDE 30 MG/1
30 CAPSULE, DELAYED RELEASE ORAL DAILY
Status: DISCONTINUED | OUTPATIENT
Start: 2022-08-09 | End: 2022-08-09 | Stop reason: HOSPADM

## 2022-08-08 RX ORDER — PSEUDOEPHEDRINE HCL 120 MG/1
120 TABLET, FILM COATED, EXTENDED RELEASE ORAL DAILY
Status: DISCONTINUED | OUTPATIENT
Start: 2022-08-09 | End: 2022-08-09 | Stop reason: HOSPADM

## 2022-08-08 RX ORDER — ACETAMINOPHEN 500 MG
1000 TABLET ORAL ONCE
Status: COMPLETED | OUTPATIENT
Start: 2022-08-08 | End: 2022-08-08

## 2022-08-08 RX ORDER — ONDANSETRON 2 MG/ML
4 INJECTION INTRAMUSCULAR; INTRAVENOUS ONCE
Status: DISCONTINUED | OUTPATIENT
Start: 2022-08-08 | End: 2022-08-08 | Stop reason: HOSPADM

## 2022-08-08 RX ORDER — PROPOFOL 10 MG/ML
INJECTION, EMULSION INTRAVENOUS AS NEEDED
Status: DISCONTINUED | OUTPATIENT
Start: 2022-08-08 | End: 2022-08-08 | Stop reason: HOSPADM

## 2022-08-08 RX ORDER — VANCOMYCIN HYDROCHLORIDE 1 G/20ML
INJECTION, POWDER, LYOPHILIZED, FOR SOLUTION INTRAVENOUS AS NEEDED
Status: DISCONTINUED | OUTPATIENT
Start: 2022-08-08 | End: 2022-08-08 | Stop reason: HOSPADM

## 2022-08-08 RX ORDER — ACETAMINOPHEN 500 MG
1000 TABLET ORAL EVERY 6 HOURS
Status: DISCONTINUED | OUTPATIENT
Start: 2022-08-08 | End: 2022-08-09 | Stop reason: HOSPADM

## 2022-08-08 RX ORDER — WATER FOR INJECTION,STERILE
VIAL (ML) INJECTION AS NEEDED
Status: DISCONTINUED | OUTPATIENT
Start: 2022-08-08 | End: 2022-08-08 | Stop reason: HOSPADM

## 2022-08-08 RX ORDER — EPHEDRINE SULFATE/0.9% NACL/PF 25 MG/5 ML
SYRINGE (ML) INTRAVENOUS AS NEEDED
Status: DISCONTINUED | OUTPATIENT
Start: 2022-08-08 | End: 2022-08-08 | Stop reason: HOSPADM

## 2022-08-08 RX ORDER — INSULIN LISPRO 100 [IU]/ML
INJECTION, SOLUTION INTRAVENOUS; SUBCUTANEOUS ONCE
Status: ACTIVE | OUTPATIENT
Start: 2022-08-08 | End: 2022-08-08

## 2022-08-08 RX ORDER — ATORVASTATIN CALCIUM 20 MG/1
20 TABLET, FILM COATED ORAL DAILY
Status: DISCONTINUED | OUTPATIENT
Start: 2022-08-09 | End: 2022-08-09 | Stop reason: HOSPADM

## 2022-08-08 RX ORDER — GLYCOPYRROLATE 0.2 MG/ML
INJECTION INTRAMUSCULAR; INTRAVENOUS AS NEEDED
Status: DISCONTINUED | OUTPATIENT
Start: 2022-08-08 | End: 2022-08-08 | Stop reason: HOSPADM

## 2022-08-08 RX ORDER — DOCUSATE SODIUM 100 MG/1
100 CAPSULE, LIQUID FILLED ORAL 2 TIMES DAILY
Status: DISCONTINUED | OUTPATIENT
Start: 2022-08-08 | End: 2022-08-09 | Stop reason: HOSPADM

## 2022-08-08 RX ORDER — CEFAZOLIN SODIUM 1 G/3ML
INJECTION, POWDER, FOR SOLUTION INTRAMUSCULAR; INTRAVENOUS AS NEEDED
Status: DISCONTINUED | OUTPATIENT
Start: 2022-08-08 | End: 2022-08-08 | Stop reason: HOSPADM

## 2022-08-08 RX ORDER — MIDAZOLAM HYDROCHLORIDE 1 MG/ML
INJECTION, SOLUTION INTRAMUSCULAR; INTRAVENOUS AS NEEDED
Status: DISCONTINUED | OUTPATIENT
Start: 2022-08-08 | End: 2022-08-08 | Stop reason: HOSPADM

## 2022-08-08 RX ORDER — CELECOXIB 400 MG/1
400 CAPSULE ORAL ONCE
Status: COMPLETED | OUTPATIENT
Start: 2022-08-08 | End: 2022-08-08

## 2022-08-08 RX ORDER — SODIUM CHLORIDE 0.9 % (FLUSH) 0.9 %
5-40 SYRINGE (ML) INJECTION AS NEEDED
Status: DISCONTINUED | OUTPATIENT
Start: 2022-08-08 | End: 2022-08-09 | Stop reason: HOSPADM

## 2022-08-08 RX ORDER — SUCCINYLCHOLINE CHLORIDE 20 MG/ML
INJECTION INTRAMUSCULAR; INTRAVENOUS AS NEEDED
Status: DISCONTINUED | OUTPATIENT
Start: 2022-08-08 | End: 2022-08-08 | Stop reason: HOSPADM

## 2022-08-08 RX ORDER — FENTANYL CITRATE 50 UG/ML
50 INJECTION, SOLUTION INTRAMUSCULAR; INTRAVENOUS AS NEEDED
Status: DISCONTINUED | OUTPATIENT
Start: 2022-08-08 | End: 2022-08-08 | Stop reason: HOSPADM

## 2022-08-08 RX ORDER — DIPHENHYDRAMINE HYDROCHLORIDE 50 MG/ML
12.5 INJECTION, SOLUTION INTRAMUSCULAR; INTRAVENOUS
Status: DISCONTINUED | OUTPATIENT
Start: 2022-08-08 | End: 2022-08-09 | Stop reason: HOSPADM

## 2022-08-08 RX ORDER — HYDROMORPHONE HYDROCHLORIDE 2 MG/ML
INJECTION, SOLUTION INTRAMUSCULAR; INTRAVENOUS; SUBCUTANEOUS AS NEEDED
Status: DISCONTINUED | OUTPATIENT
Start: 2022-08-08 | End: 2022-08-08 | Stop reason: HOSPADM

## 2022-08-08 RX ORDER — OXYCODONE HCL 10 MG/1
20 TABLET, FILM COATED, EXTENDED RELEASE ORAL EVERY 12 HOURS
Status: DISCONTINUED | OUTPATIENT
Start: 2022-08-08 | End: 2022-08-09 | Stop reason: HOSPADM

## 2022-08-08 RX ORDER — FENTANYL CITRATE 50 UG/ML
INJECTION, SOLUTION INTRAMUSCULAR; INTRAVENOUS AS NEEDED
Status: DISCONTINUED | OUTPATIENT
Start: 2022-08-08 | End: 2022-08-08 | Stop reason: HOSPADM

## 2022-08-08 RX ORDER — SODIUM CHLORIDE 0.9 % (FLUSH) 0.9 %
5-40 SYRINGE (ML) INJECTION EVERY 8 HOURS
Status: DISCONTINUED | OUTPATIENT
Start: 2022-08-08 | End: 2022-08-09 | Stop reason: HOSPADM

## 2022-08-08 RX ORDER — FAMOTIDINE 20 MG/1
20 TABLET, FILM COATED ORAL ONCE
Status: COMPLETED | OUTPATIENT
Start: 2022-08-08 | End: 2022-08-08

## 2022-08-08 RX ADMIN — Medication 10 MG: at 12:30

## 2022-08-08 RX ADMIN — DEXAMETHASONE SODIUM PHOSPHATE 4 MG: 4 INJECTION, SOLUTION INTRAMUSCULAR; INTRAVENOUS at 12:10

## 2022-08-08 RX ADMIN — WATER 20 ML: 1 INJECTION INTRAMUSCULAR; INTRAVENOUS; SUBCUTANEOUS at 11:29

## 2022-08-08 RX ADMIN — SODIUM CHLORIDE, SODIUM LACTATE, POTASSIUM CHLORIDE, AND CALCIUM CHLORIDE: 600; 310; 30; 20 INJECTION, SOLUTION INTRAVENOUS at 12:49

## 2022-08-08 RX ADMIN — Medication 100 MCG: at 11:29

## 2022-08-08 RX ADMIN — MIDAZOLAM HYDROCHLORIDE 2 MG: 2 INJECTION, SOLUTION INTRAMUSCULAR; INTRAVENOUS at 11:17

## 2022-08-08 RX ADMIN — Medication 5 MG: at 12:45

## 2022-08-08 RX ADMIN — OXYCODONE HYDROCHLORIDE 10 MG: 5 TABLET ORAL at 22:44

## 2022-08-08 RX ADMIN — FAMOTIDINE 40 MG: 20 TABLET ORAL at 20:34

## 2022-08-08 RX ADMIN — Medication 10 MG: at 11:29

## 2022-08-08 RX ADMIN — Medication 20 MG: at 11:43

## 2022-08-08 RX ADMIN — OXYCODONE HYDROCHLORIDE 20 MG: 20 TABLET, FILM COATED, EXTENDED RELEASE ORAL at 10:43

## 2022-08-08 RX ADMIN — TAMSULOSIN HYDROCHLORIDE 0.4 MG: 0.4 CAPSULE ORAL at 10:43

## 2022-08-08 RX ADMIN — ACETAMINOPHEN 1000 MG: 500 TABLET ORAL at 10:43

## 2022-08-08 RX ADMIN — FENTANYL CITRATE 50 MCG: 50 INJECTION, SOLUTION INTRAMUSCULAR; INTRAVENOUS at 15:19

## 2022-08-08 RX ADMIN — GLYCOPYRROLATE 0.4 MG: 0.2 INJECTION INTRAMUSCULAR; INTRAVENOUS at 13:22

## 2022-08-08 RX ADMIN — CEFAZOLIN 2 G: 1 INJECTION, POWDER, FOR SOLUTION INTRAMUSCULAR; INTRAVENOUS at 11:29

## 2022-08-08 RX ADMIN — Medication 3 MG: at 13:22

## 2022-08-08 RX ADMIN — ONDANSETRON 4 MG: 2 INJECTION INTRAMUSCULAR; INTRAVENOUS at 20:19

## 2022-08-08 RX ADMIN — Medication 100 MCG: at 12:32

## 2022-08-08 RX ADMIN — LIDOCAINE HYDROCHLORIDE 100 MG: 20 INJECTION, SOLUTION EPIDURAL; INFILTRATION; INTRACAUDAL; PERINEURAL at 11:23

## 2022-08-08 RX ADMIN — OXYCODONE HYDROCHLORIDE 20 MG: 20 TABLET, FILM COATED, EXTENDED RELEASE ORAL at 21:22

## 2022-08-08 RX ADMIN — ROCURONIUM BROMIDE 25 MG: 50 INJECTION INTRAVENOUS at 11:28

## 2022-08-08 RX ADMIN — HYDROMORPHONE HYDROCHLORIDE 0.5 MG: 1 INJECTION, SOLUTION INTRAMUSCULAR; INTRAVENOUS; SUBCUTANEOUS at 14:06

## 2022-08-08 RX ADMIN — FENTANYL CITRATE 100 MCG: 50 INJECTION, SOLUTION INTRAMUSCULAR; INTRAVENOUS at 11:23

## 2022-08-08 RX ADMIN — HYDROMORPHONE HYDROCHLORIDE 0.5 MG: 1 INJECTION, SOLUTION INTRAMUSCULAR; INTRAVENOUS; SUBCUTANEOUS at 14:49

## 2022-08-08 RX ADMIN — HYDROMORPHONE HYDROCHLORIDE 0.2 MG: 2 INJECTION, SOLUTION INTRAMUSCULAR; INTRAVENOUS; SUBCUTANEOUS at 13:27

## 2022-08-08 RX ADMIN — PROPOFOL 50 MG: 10 INJECTION, EMULSION INTRAVENOUS at 13:25

## 2022-08-08 RX ADMIN — ROCURONIUM BROMIDE 10 MG: 50 INJECTION INTRAVENOUS at 12:17

## 2022-08-08 RX ADMIN — CELECOXIB 400 MG: 400 CAPSULE ORAL at 10:43

## 2022-08-08 RX ADMIN — HYDROMORPHONE HYDROCHLORIDE 0.5 MG: 1 INJECTION, SOLUTION INTRAMUSCULAR; INTRAVENOUS; SUBCUTANEOUS at 14:29

## 2022-08-08 RX ADMIN — HYDROMORPHONE HYDROCHLORIDE 0.5 MG: 1 INJECTION, SOLUTION INTRAMUSCULAR; INTRAVENOUS; SUBCUTANEOUS at 14:39

## 2022-08-08 RX ADMIN — Medication 10 MG: at 11:27

## 2022-08-08 RX ADMIN — OXYCODONE HYDROCHLORIDE 10 MG: 5 TABLET ORAL at 18:09

## 2022-08-08 RX ADMIN — ROCURONIUM BROMIDE 5 MG: 50 INJECTION INTRAVENOUS at 11:23

## 2022-08-08 RX ADMIN — PROPOFOL 200 MG: 10 INJECTION, EMULSION INTRAVENOUS at 11:23

## 2022-08-08 RX ADMIN — SODIUM CHLORIDE, PRESERVATIVE FREE 10 ML: 5 INJECTION INTRAVENOUS at 22:52

## 2022-08-08 RX ADMIN — FENTANYL CITRATE 50 MCG: 50 INJECTION, SOLUTION INTRAMUSCULAR; INTRAVENOUS at 15:02

## 2022-08-08 RX ADMIN — WATER 2 G: 1 INJECTION INTRAMUSCULAR; INTRAVENOUS; SUBCUTANEOUS at 18:09

## 2022-08-08 RX ADMIN — Medication 5 MG: at 12:56

## 2022-08-08 RX ADMIN — Medication 200 MCG: at 11:25

## 2022-08-08 RX ADMIN — ROCURONIUM BROMIDE 10 MG: 50 INJECTION INTRAVENOUS at 11:54

## 2022-08-08 RX ADMIN — SUCCINYLCHOLINE CHLORIDE 100 MG: 20 INJECTION, SOLUTION INTRAMUSCULAR; INTRAVENOUS at 11:23

## 2022-08-08 RX ADMIN — Medication 100 MCG: at 12:45

## 2022-08-08 RX ADMIN — Medication 100 MCG: at 12:56

## 2022-08-08 RX ADMIN — PREGABALIN 75 MG: 75 CAPSULE ORAL at 10:43

## 2022-08-08 RX ADMIN — HYDROMORPHONE HYDROCHLORIDE 0.2 MG: 2 INJECTION, SOLUTION INTRAMUSCULAR; INTRAVENOUS; SUBCUTANEOUS at 13:25

## 2022-08-08 RX ADMIN — HYDROMORPHONE HYDROCHLORIDE 0.6 MG: 2 INJECTION, SOLUTION INTRAMUSCULAR; INTRAVENOUS; SUBCUTANEOUS at 13:50

## 2022-08-08 RX ADMIN — ONDANSETRON 4 MG: 2 INJECTION INTRAMUSCULAR; INTRAVENOUS at 13:10

## 2022-08-08 RX ADMIN — SODIUM CHLORIDE, SODIUM LACTATE, POTASSIUM CHLORIDE, AND CALCIUM CHLORIDE 50 ML/HR: 600; 310; 30; 20 INJECTION, SOLUTION INTRAVENOUS at 10:43

## 2022-08-08 RX ADMIN — PROPOFOL 30 MG: 10 INJECTION, EMULSION INTRAVENOUS at 13:32

## 2022-08-08 RX ADMIN — ROCURONIUM BROMIDE 10 MG: 50 INJECTION INTRAVENOUS at 12:41

## 2022-08-08 RX ADMIN — FAMOTIDINE 20 MG: 20 TABLET ORAL at 10:43

## 2022-08-08 RX ADMIN — ACETAMINOPHEN 1000 MG: 500 TABLET ORAL at 18:09

## 2022-08-08 RX ADMIN — DOCUSATE SODIUM 100 MG: 100 CAPSULE, LIQUID FILLED ORAL at 18:09

## 2022-08-08 RX ADMIN — HYDROMORPHONE HYDROCHLORIDE 1 MG: 1 INJECTION, SOLUTION INTRAMUSCULAR; INTRAVENOUS; SUBCUTANEOUS at 17:03

## 2022-08-08 RX ADMIN — Medication 10 MG: at 11:49

## 2022-08-08 RX ADMIN — LORAZEPAM 1 MG: 2 INJECTION, SOLUTION INTRAMUSCULAR; INTRAVENOUS at 20:38

## 2022-08-08 RX ADMIN — TRANEXAMIC ACID 1 G: 100 INJECTION, SOLUTION INTRAVENOUS at 11:42

## 2022-08-08 RX ADMIN — HYDROMORPHONE HYDROCHLORIDE 0.4 MG: 2 INJECTION, SOLUTION INTRAMUSCULAR; INTRAVENOUS; SUBCUTANEOUS at 12:17

## 2022-08-08 NOTE — ANESTHESIA POSTPROCEDURE EVALUATION
Procedure(s):  LEFT L3/4 TRANSFORAMINAL LUMBAR INTERBODY FUSION (TLIF)/REVISION INSTRUMENTATION/C-ARM/OLGA. general    Anesthesia Post Evaluation      Multimodal analgesia: multimodal analgesia used between 6 hours prior to anesthesia start to PACU discharge  Patient location during evaluation: bedside  Patient participation: complete - patient participated  Level of consciousness: awake  Pain management: adequate  Airway patency: patent  Anesthetic complications: no  Cardiovascular status: stable  Respiratory status: acceptable  Hydration status: acceptable  Post anesthesia nausea and vomiting:  controlled      INITIAL Post-op Vital signs:   Vitals Value Taken Time   /62 08/08/22 1605   Temp 36.7 °C (98.1 °F) 08/08/22 1345   Pulse 102 08/08/22 1607   Resp 14 08/08/22 1607   SpO2 97 % 08/08/22 1607   Vitals shown include unvalidated device data.

## 2022-08-08 NOTE — H&P
Pre-Admission History and Physical    Patient: Ángel Ventura   MRN: [id number]   SSN: [id number]   YOB: 1960   Age: 64 y.o. Sex: male     Patient scheduled for: lumbar three four transforaminal lumbar interbody fusion on the left, revision instrumentation lumbar four five. Date of surgery: 8/8/2022. Surgeon: Henna Mason MD    HPI:  Ángel Ventura is a 64 y.o. male with severe pain in his back radiating down his left greater than right leg. He has difficult time ambulating or standing. MRI demonstrates moderately severe junctional stenosis above his L4/5 fusion with left greater than right lateral recess and foraminal stenosis with a far lateral disc herniation also at L3/4 on the left. This patient has failed the presurgical conservative treatments  including physical therapy, spinal block injections and medications. Pain has impacted the patient's functional ability. He is being admitted for surgical intervention.          Past Medical History:   Diagnosis Date    ADD (attention deficit disorder)     Anxiety     Asthma     mild per pt    COVID-19 04/2022    Hypercholesteremia     Hyperlipidemia     Male hypogonadism     MRSA (methicillin resistant Staphylococcus aureus)     right forearm approximately 2007    Rosacea      Social History     Socioeconomic History    Marital status:    Occupational History    Occupation: ENT Physician     Comment: [employer]   Tobacco Use    Smoking status: Never    Smokeless tobacco: Never   Substance and Sexual Activity    Alcohol use: No    Drug use: No     Past Surgical History:   Procedure Laterality Date    HX CARPAL TUNNEL RELEASE      bilateral    HX HERNIA REPAIR  1996    inguinal hernia repair    HX LUMBAR DISKECTOMY  10/19/2015    Dr.KERNER gregg    HX LUMBAR LAMINECTOMY  10/19/2015    JACQUELINE DUDLEY    HX LUMBAR LAMINECTOMY  05/25/2016    HX MOHS PROCEDURES      bilateral 2x on the right, 1x on the left    MD ABDOMEN SURGERY PROC UNLISTED      denies 8/4/2022    MS SINUS SURGERY PROC UNLISTED  1998     Family History   Problem Relation Age of Onset    No Known Problems Mother     Diabetes Father     No Known Problems Sister     Hypertension Brother     High Cholesterol Brother     No Known Problems Brother      No Known Allergies  Current Outpatient Medications   Medication Sig Dispense Refill    atorvastatin (LIPITOR) 20 mg tablet Take 20 mg by mouth in the morning. DULoxetine (CYMBALTA) 30 mg capsule Take 30 mg by mouth in the morning. naproxen EC (NAPROSYN EC) 500 mg EC tablet Take 500 mg by mouth two (2) times daily (with meals). testosterone cypionate (DEPOTESTOTERONE CYPIONATE) 200 mg/mL injection by IntraMUSCular route every Wednesday. 100 mg every week      DULoxetine (CYMBALTA) 60 mg capsule TK ONE C PO QD WITH 30MG  1    traZODone (DESYREL) 100 mg tablet nightly as needed. Takes 25 to 50 mg as needed      fluticasone (CUTIVATE) 0.05 % topical cream TERESE A PEA-SIZED AMOUNT AA OF THE FACE D FOR 2 WEEKS  2    CLARITIN-D 12 HOUR 5-120 mg per tablet Take 1 Tab by mouth daily. 3    VYVANSE 40 mg capsule Take 40 mg by mouth daily. 3 days a week on work days  0    MIRVASO 0.33 % gel daily. 0       ROS:  Denies chills, fever,night sweats,  bowel or bladder dysfunction, unexplained weight loss/weight gain, chest pain, sob or anxiety. Physical Examination    Gen: Well developed, well nourished 64 y.o. male with antalgic ROM of his lumbar spine. Normal strength of his ehl, ta, hams and quads. Mechanical back pain and radiating left leg pain. Assessment and Plan    Due to the pt's persistent symptoms unrelieved by conservative measure Luis Alberto Section is being admitted to undergo surgical intervention. The post-operative plan of care consists of physical therapy, home health and a 2 week f/u office visit.   The risks, benefits, complications and alternatives to surgery have been discussed in detail with the patient. The patient understands and agrees to proceed.

## 2022-08-08 NOTE — INTERVAL H&P NOTE
Update History & Physical    The Patient's History and Physical of August 7, 2022 was reviewed with the patient and I examined the patient. There was no change. The surgical site was confirmed by the patient and me. Plan:  The risk, benefits, expected outcome, and alternative to the recommended procedure have been discussed with the patient. Patient understands and wants to proceed with the procedure.     Electronically signed by Lauren Mejía MD on 8/8/2022 at 10:04 AM

## 2022-08-08 NOTE — PROGRESS NOTES
Problem: Mobility Impaired (Adult and Pediatric)  Goal: *Acute Goals and Plan of Care (Insert Text)  Description: Physical Therapy Goals  Initiated 8/8/2022 and to be accomplished within 7 day(s)  1. Patient will move from supine to sit and sit to supine , scoot up and down, and roll side to side in bed with modified independence. 2.  Patient will transfer from bed to chair and chair to bed with modified independence using the least restrictive device. 3.  Patient will perform sit to stand with modified independence. 4.  Patient will ambulate with modified independence for 500 feet with the least restrictive device. 5.  Patient will ascend/descend 5 stairs with unilateral handrail(s) with modified independence. PLOF: Pt reporting he lives with wife in 2 story house with first floor set up with 5 ALLIE with handrails. Pt has rollator and wheelchair. Angelina PTA. Outcome: Progressing Towards Goal   PHYSICAL THERAPY EVALUATION    Patient: Tarah Bennett (77 y.o. male)  Date: 8/8/2022  Primary Diagnosis: Spondylolisthesis of lumbar region [M43.16]  HNP (herniated nucleus pulposus), lumbar [M51.26]  Spinal stenosis, lumbar region with neurogenic claudication [M48.062]  Lumbar stenosis [M48.061]  Procedure(s) (LRB):  LEFT L3/4 TRANSFORAMINAL LUMBAR INTERBODY FUSION (TLIF)/REVISION INSTRUMENTATION/C-ARM/OLGA (N/A) Day of Surgery   Precautions: SPINAL       ASSESSMENT :  Pt cleared to participate in PT session, pt received semi-reclined in bed and agreeable to therapy session with wife at bedside. Based on the objective data described below, the patient presents with decreased balance reactions and decreased independence in functional mobility. Pt educated on spinal precautions using log roll, no twisting. Pt performing log roll to EOB with SBA. Good sitting balance, reporting previous numbness through L thigh but with improvements during session. Pt then standing with CGA, ambulating x400 feet with CGA. Several verbal cues for no twisting during ambulation. Pt returned to sitting in recliner. Pt positioned for comfort and educated to call for assist before getting up, pt verbalized understanding. Pt left with all needs met and call bell in reach. RN notified of position and participation. Pt not yet safe for discharge home, will need stair training prior to discharge. Patient will benefit from skilled intervention to address the above impairments. Patient's rehabilitation potential is considered to be Good  Factors which may influence rehabilitation potential include:   [x]         None noted  []         Mental ability/status  []         Medical condition  []         Home/family situation and support systems  []         Safety awareness  []         Pain tolerance/management  []         Other:      PLAN :  Recommendations and Planned Interventions:   [x]           Bed Mobility Training             []    Neuromuscular Re-Education  [x]           Transfer Training                   []    Orthotic/Prosthetic Training  [x]           Gait Training                          []    Modalities  [x]           Therapeutic Exercises           []    Edema Management/Control  [x]           Therapeutic Activities            [x]    Family Training/Education  [x]           Patient Education  []           Other (comment):    Frequency/Duration: Patient will be followed by physical therapy 1-2 times per day/4-7 days per week to address goals. Further Equipment Recommendations for Discharge: pt has all equipment for home    AMPAC:Based on an AM-PAC score of 18/24 and their current functional mobility deficits, it is recommended that the patient have 2-3 sessions per week of Physical Therapy at d/c to increase the patient's independence. This AMPAC score should be considered in conjunction with interdisciplinary team recommendations to determine the most appropriate discharge setting.  Patient's social support, diagnosis, medical stability, and prior level of function should also be taken into consideration. SUBJECTIVE:   Patient stated I want to keep walking.     OBJECTIVE DATA SUMMARY:     Past Medical History:   Diagnosis Date    ADD (attention deficit disorder)     Anxiety     Asthma     mild per pt    COVID-19 04/2022    Hypercholesteremia     Hyperlipidemia     Male hypogonadism     MRSA (methicillin resistant Staphylococcus aureus)     right forearm approximately 2007    Rosacea      Past Surgical History:   Procedure Laterality Date    HX CARPAL TUNNEL RELEASE      bilateral    HX HERNIA REPAIR  1996    inguinal hernia repair    HX LUMBAR DISKECTOMY  10/19/2015    Dr.KERNER gregg    HX LUMBAR LAMINECTOMY  10/19/2015    JACQUELINE DUDLEY    HX LUMBAR LAMINECTOMY  05/25/2016    HX MOHS PROCEDURES      bilateral 2x on the right, 1x on the left    [place] ABDOMEN SURGERY PROC UNLISTED      denies 8/4/2022    OH SINUS SURGERY PROC UNLISTED  1998     Barriers to Learning/Limitations: None  Compensate with: N/A  Home Situation:  Home Situation  Home Environment: Private residence  # Steps to Enter: 5  Rails to Enter: Yes  Hand Rails : Bilateral  One/Two Story Residence: Two story, live on 1st floor  Living Alone: No  Support Systems: Spouse/Significant Other, Child(mac)  Patient Expects to be Discharged to[de-identified] Home  Current DME Used/Available at Home: Bouchra Budd, rollator, Wheelchair  Critical Behavior:  Neurologic State: Alert  Orientation Level: Oriented X4  Cognition: Appropriate decision making  Safety/Judgement: Awareness of environment; Fall prevention  Psychosocial  Patient Behaviors: Calm; Cooperative  Family  Behaviors: Supportive     Family  Behaviors: Supportive    Strength:    Strength:  Within functional limits    Tone & Sensation:   Tone: Normal    Sensation: Impaired (L thigh numbness)    Range Of Motion:  AROM: Within functional limits    Posture:  Posture (WDL): Within defined limits     Functional Mobility:  Bed Mobility:  Rolling: Stand-by assistance  Supine to Sit: Stand-by assistance     Scooting: Stand-by assistance  Transfers:  Sit to Stand: Contact guard assistance  Stand to Sit: Contact guard assistance    Balance:   Sitting: Intact  Standing: Intact; With support    Ambulation/Gait Training:  Distance (ft): 400 Feet (ft)  Assistive Device:  (intermittent handhold from wife)  Ambulation - Level of Assistance: Contact guard assistance     Gait Description (WDL): Exceptions to WDL  Gait Abnormalities: Decreased step clearance    Step Length: Right shortened;Left shortened    Pain:  Pain level pre-treatment: 7/10   Pain level post-treatment: 7/10   Pain Intervention(s) :  Rest, Ice, Repositioning  Response to intervention: Nurse notified    Activity Tolerance:   Good tolerance     Please refer to the flowsheet for vital signs taken during this treatment. After treatment:   [x]         Patient left in no apparent distress sitting up in chair  []         Patient left in no apparent distress in bed  [x]         Call bell left within reach  [x]         Nursing notified  []         Caregiver present  []         Bed alarm activated  []         SCDs applied    COMMUNICATION/EDUCATION:   [x]         Role of Physical Therapy in the acute care setting. [x]         Fall prevention education was provided and the patient/caregiver indicated understanding. [x]         Patient/family have participated as able in goal setting and plan of care. [x]         Patient/family agree to work toward stated goals and plan of care. []         Patient understands intent and goals of therapy, but is neutral about his/her participation. []         Patient is unable to participate in goal setting/plan of care: ongoing with therapy staff.  []         Other:     Thank you for this referral.  Freddy Snider, PT   Time Calculation: 25 mins      Eval Complexity: History: MEDIUM  Complexity : 1-2 comorbidities / personal factors will impact the outcome/ POC Exam:LOW Complexity : 1-2 Standardized tests and measures addressing body structure, function, activity limitation and / or participation in recreation  Presentation: LOW Complexity : Stable, uncomplicated  Clinical Decision Making:Low Complexity low  Overall Complexity:LOW     [facility] AM-PAC® Basic Mobility Inpatient Short Form (6-Clicks) Version 2    How much HELP from another person does the patient currently need    (If the patient hasn't done an activity recently, how much help from another person do you think he/she would need if he/she tried?)   Total (Total A or Dep)   A Lot  (Mod to Max A)   A Little (Sup or Min A)   None (Mod I to I)   Turning from your back to your side while in a flat bed without using bedrails? [] 1 [] 2 [x] 3 [] 4   2. Moving from lying on your back to sitting on the side of a flat bed without using bedrails? [] 1 [] 2 [x] 3 [] 4   3. Moving to and from a bed to a chair (including a wheelchair)? [] 1 [] 2 [x] 3 [] 4   4. Standing up from a chair using your arms (e.g., wheelchair, or bedside chair)? [] 1 [] 2 [x] 3 [] 4   5. Walking in hospital room? [] 1 [] 2 [x] 3 [] 4   6. Climbing 3-5 steps with a railing?+   [] 1 [] 2 [x] 3 [] 4   +If stair climbing cannot be assessed, skip item #6. Sum responses from items 1-5.

## 2022-08-08 NOTE — ROUTINE PROCESS
Bedside and Verbal shift change report given to CICI Guy (oncoming nurse) by Swati Talley (offgoing nurse). Report included the following information SBAR, Kardex, Procedure Summary, Intake/Output, MAR, and Recent Results.

## 2022-08-08 NOTE — BRIEF OP NOTE
Brief Postoperative Note    Patient: Claude Leep  YOB: 1960  MRN: [id number]    Date of Procedure: 8/8/2022     Pre-Op Diagnosis: Spondylolisthesis of lumbar region [M43.16]  HNP (herniated nucleus pulposus), lumbar [M51.26]  Spinal stenosis, lumbar region with neurogenic claudication [M48.062]    Post-Op Diagnosis: Same as preoperative diagnosis. Procedure(s):  LEFT L3/4 TRANSFORAMINAL LUMBAR INTERBODY FUSION (TLIF)/REVISION INSTRUMENTATION/C-ARM/OLGA    Surgeon(s):  Ariadne De Paz MD    Surgical Assistant: Physician Assistant: Ancelmo Mayo PA-C  Surg Asst-1: Alex Guerrier    Anesthesia: General     Estimated Blood Loss (mL): less than 380     Complications: None    Specimens: * No specimens in log *     Implants:   Implant Name Type Inv.  Item Serial No.  Lot No. LRB No. Used Action   PUTTY BONE GRAFT FACTOR 5ML PEPTIDE ENHANCED W/SYRINGE - [id number]  PUTTY BONE GRAFT FACTOR 5ML PEPTIDE ENHANCED W/SYRINGE  [employer]_WD [id number] N/A 2 Implanted   SPACER SPNL 7 DEG SM 28X12 MM STRL PROLIFT - [id number]  SPACER SPNL 7 DEG SM 28X12 MM STRL PROLIFT  OLGA [employer] HOW_WD [id number] N/A 1 Implanted   PUTTY BONE GRAFT FACTOR 5ML PEPTIDE ENHANCED W/SYRINGE - [id number]  PUTTY BONE GRAFT FACTOR 5ML PEPTIDE ENHANCED W/SYRINGE  [employer]WD [id number] N/A 1 Implanted       Drains:   Jose Luis-Tomlinson Drain 08/08/22 Posterior;Left;Lower Back (Active)       Findings: stenosis, collapse, far lateral hnp    Electronically Signed by Chrissy Salter MD on 8/8/2022 at 1:20 PM

## 2022-08-08 NOTE — ANESTHESIA PREPROCEDURE EVALUATION
Relevant Problems   No relevant active problems       Anesthetic History   No history of anesthetic complications            Review of Systems / Medical History  Patient summary reviewed and pertinent labs reviewed    Pulmonary            Asthma : well controlled       Neuro/Psych   Within defined limits           Cardiovascular                  Exercise tolerance: >4 METS     GI/Hepatic/Renal                Endo/Other        Arthritis     Other Findings              Physical Exam    Airway  Mallampati: III  TM Distance: 4 - 6 cm  Neck ROM: decreased range of motion   Mouth opening: Normal     Cardiovascular    Rhythm: regular  Rate: normal         Dental  No notable dental hx       Pulmonary  Breath sounds clear to auscultation               Abdominal  GI exam deferred       Other Findings            Anesthetic Plan    ASA: 2  Anesthesia type: general          Induction: Intravenous  Anesthetic plan and risks discussed with: Patient

## 2022-08-08 NOTE — ROUTINE PROCESS
Patient admitted to the floor from PACU. He is awake and alert. Oriented to the room and how to use the call bell. Patient reports prior to surgery he had numbness Lt.thigh which has greatly improved post Op. And nerve pain Lt. Buttocks area. No other neurovascular deficits noted,pulses present and moving all extremities without difficulty.

## 2022-08-08 NOTE — ROUTINE PROCESS
TRANSFER - OUT REPORT:    Verbal report given to Lanie(name) on Shania Can  being transferred to room 2207(unit) for routine progression of care       Report consisted of patients Situation, Background, Assessment and   Recommendations(SBAR). Information from the following report(s) SBAR, OR Summary, Intake/Output, and MAR was reviewed with the receiving nurse. Lines:   Peripheral IV 08/08/22 Anterior;Left;Proximal Forearm (Active)   Site Assessment Clean, dry, & intact 08/08/22 1345   Phlebitis Assessment 0 08/08/22 1345   Infiltration Assessment 0 08/08/22 1345   Dressing Status Clean, dry, & intact 08/08/22 1345   Dressing Type Tape;Transparent 08/08/22 1345   Hub Color/Line Status Pink;Capped 08/08/22 1345   Action Taken Dressing reinforced 08/08/22 1024   Alcohol Cap Used No 08/08/22 1024       Peripheral IV 08/08/22 Anterior;Right Forearm (Active)   Site Assessment Clean, dry, & intact 08/08/22 1345   Phlebitis Assessment 0 08/08/22 1345   Infiltration Assessment 0 08/08/22 1345   Dressing Status Clean, dry, & intact 08/08/22 1345   Dressing Type Tape;Transparent 08/08/22 1345   Hub Color/Line Status Pink; Infusing 08/08/22 1345   Action Taken Dressing reinforced 08/08/22 1026   Alcohol Cap Used No 08/08/22 1026        Opportunity for questions and clarification was provided.       Patient transported with:   Favery

## 2022-08-08 NOTE — OP NOTES
[address] [place] [address]   OPERATIVE REPORT    Name:  Saskia Patel  MR#:   [id number]  :  1960  ACCOUNT #:  [de-identified]  DATE OF SERVICE:  2022    PREOPERATIVE DIAGNOSES:  Post-laminectomy syndrome lumbar, lumbar stenosis with far lateral disk herniation L3-4. POSTOPERATIVE DIAGNOSES:  Post-laminectomy syndrome lumbar, lumbar stenosis with far lateral disk herniation L3-4. PROCEDURES PERFORMED:  L3-4 left laminectomy, facetectomy, foraminotomy, diskectomy, resection of far lateral disk herniation. L3-4 transforaminal lumbar interbody fusion with expandable titanium cage, Center Line type, i-Factor and autograft. Posterolateral fusion L3-4 with i-Factor and autograft. Segmental spinal instrumentation L3, L4, L5 with Center Line screws and rods. Removal of hardware, L4-5. SURGEON:  Jaycee Garcia MD    ASSISTANT:  DAVONTE Crooks    ANESTHESIA:  General endotracheal.    COMPLICATIONS:  No complications. SPECIMENS REMOVED:  No specimens. IMPLANTS:  Shirin pedicle screw and gurvinder system, Shirin interbody cage. ESTIMATED BLOOD LOSS:  100 mL. FINDINGS:  We were able to decompress the patient thoroughly. The patient's bone quality was fair. Restored disk space height and segmental lordosis. Revised previous instrumentation and added additional level without issue. PROCEDURE:  Following induction of tracheal anesthesia, the patient was turned in prone position on spinal frame. The patient was prepped and draped in the usual fashion. Midline incision was made. Paramedian incision was made in the lumbodorsal fascia and subperiosteal dissection done of L3, L4, L5.  C-arm image verified our surgical level, in addition to palpating previous instrumentation. Pedicle screws were placed in the pedicles of L3 utilizing the anatomic landmarks, C-arm image and direct palpation. Holes were made with an awl, palpated, probed, and the appropriate 6.5 x 45 screws placed with good fixation. Previous instrumentation was removed with new 7.5 x 50 pedicle screws placed in L5 and L4. The L3-4 segment was debrided. The facets debrided and mobilized. A left hemilaminectomy, subtotal facetectomy, foraminotomy were done. There was severe foraminal stenosis due to the far lateral disk herniation and osseous bone spurring causing the foraminal stenosis. Following the decompression of the disk and resection of marginal osteophytes in the uncinate, the exiting and traversing root passed well. Sofie and curettes were utilized to prepare the disk space and mobilize it. A 10-mm expanding interbody device was utilized that expanded until it was under reasonable tension, restored disk space height and placed in the near midline position. Prior to placing of the device, the disk space was filled with demineralized bone matrix, i-Factor, and autograft. After placement of the cage, the neural elements were inspected without issue. Gelfoam placed over the laminectomy site. Cottonoids, which had been placed to protect the neural elements, were removed. At this point, lordosed rods were placed on the screw heads and final tightening and torquing done on the contralateral right side. The facets and intertransverse region were decorticated with a high-speed bur and again grafted with a combination of autograft and i-Factor. Gelfoam placed over the laminectomy site. A deep drain utilized. Vancomycin powder instilled for infection prophylaxis. Lumbodorsal fascia closed with #1 Vicryl, subcutaneous tissues closed with 2-0 Vicryl, and the skin closed with a 3-0 Monocryl subcuticular suture and Dermabond. A sterile occlusive dressing was placed upon the wound. All counts were correct.       MD WALLY Hidalgo/[email or web address]  D:  08/08/2022 13:39  T:  08/08/2022 14:57  JOB #:  [id number]

## 2022-08-09 ENCOUNTER — HOME HEALTH ADMISSION (OUTPATIENT)
Dept: HOME HEALTH SERVICES | Facility: HOME HEALTH | Age: 62
End: 2022-08-09
Payer: COMMERCIAL

## 2022-08-09 VITALS
TEMPERATURE: 97.8 F | HEIGHT: 70 IN | OXYGEN SATURATION: 95 % | BODY MASS INDEX: 28.06 KG/M2 | SYSTOLIC BLOOD PRESSURE: 129 MMHG | RESPIRATION RATE: 20 BRPM | WEIGHT: 196 LBS | HEART RATE: 75 BPM | DIASTOLIC BLOOD PRESSURE: 67 MMHG

## 2022-08-09 LAB
ERYTHROCYTE [DISTWIDTH] IN BLOOD BY AUTOMATED COUNT: 12.8 % (ref 11.6–14.5)
HCT VFR BLD AUTO: 37.5 % (ref 36–48)
HGB BLD-MCNC: 12.4 G/DL (ref 13–16)
MCH RBC QN AUTO: 31 PG (ref 24–34)
MCHC RBC AUTO-ENTMCNC: 33.1 G/DL (ref 31–37)
MCV RBC AUTO: 93.8 FL (ref 78–100)
NRBC # BLD: 0 K/UL (ref 0–0.01)
NRBC BLD-RTO: 0 PER 100 WBC
PLATELET # BLD AUTO: 261 K/UL (ref 135–420)
PMV BLD AUTO: 9.7 FL (ref 9.2–11.8)
RBC # BLD AUTO: 4 M/UL (ref 4.35–5.65)
WBC # BLD AUTO: 10.9 K/UL (ref 4.6–13.2)

## 2022-08-09 PROCEDURE — 97116 GAIT TRAINING THERAPY: CPT

## 2022-08-09 PROCEDURE — 97165 OT EVAL LOW COMPLEX 30 MIN: CPT

## 2022-08-09 PROCEDURE — 36415 COLL VENOUS BLD VENIPUNCTURE: CPT

## 2022-08-09 PROCEDURE — 74011000250 HC RX REV CODE- 250: Performed by: ORTHOPAEDIC SURGERY

## 2022-08-09 PROCEDURE — 74011250637 HC RX REV CODE- 250/637: Performed by: ORTHOPAEDIC SURGERY

## 2022-08-09 PROCEDURE — 2709999900 HC NON-CHARGEABLE SUPPLY

## 2022-08-09 PROCEDURE — 85027 COMPLETE CBC AUTOMATED: CPT

## 2022-08-09 PROCEDURE — 74011250636 HC RX REV CODE- 250/636: Performed by: ORTHOPAEDIC SURGERY

## 2022-08-09 RX ORDER — CYCLOBENZAPRINE HCL 10 MG
10 TABLET ORAL
Qty: 30 TABLET | Refills: 0 | Status: SHIPPED | OUTPATIENT
Start: 2022-08-09

## 2022-08-09 RX ORDER — OXYCODONE HYDROCHLORIDE 5 MG/1
5 TABLET ORAL
Qty: 28 TABLET | Refills: 0 | Status: SHIPPED | OUTPATIENT
Start: 2022-08-09 | End: 2022-08-16

## 2022-08-09 RX ADMIN — ATORVASTATIN CALCIUM 20 MG: 20 TABLET, FILM COATED ORAL at 09:56

## 2022-08-09 RX ADMIN — OXYCODONE HYDROCHLORIDE 10 MG: 5 TABLET ORAL at 04:15

## 2022-08-09 RX ADMIN — ACETAMINOPHEN 1000 MG: 500 TABLET ORAL at 00:52

## 2022-08-09 RX ADMIN — ACETAMINOPHEN 1000 MG: 500 TABLET ORAL at 06:42

## 2022-08-09 RX ADMIN — TAMSULOSIN HYDROCHLORIDE 0.4 MG: 0.4 CAPSULE ORAL at 09:56

## 2022-08-09 RX ADMIN — DULOXETINE HYDROCHLORIDE 30 MG: 30 CAPSULE, DELAYED RELEASE ORAL at 09:55

## 2022-08-09 RX ADMIN — HYDROMORPHONE HYDROCHLORIDE 1 MG: 1 INJECTION, SOLUTION INTRAMUSCULAR; INTRAVENOUS; SUBCUTANEOUS at 00:40

## 2022-08-09 RX ADMIN — WATER 2 G: 1 INJECTION INTRAMUSCULAR; INTRAVENOUS; SUBCUTANEOUS at 04:03

## 2022-08-09 RX ADMIN — FAMOTIDINE 40 MG: 20 TABLET ORAL at 09:52

## 2022-08-09 RX ADMIN — OXYCODONE HYDROCHLORIDE 20 MG: 20 TABLET, FILM COATED, EXTENDED RELEASE ORAL at 09:57

## 2022-08-09 RX ADMIN — DULOXETINE HYDROCHLORIDE 60 MG: 60 CAPSULE, DELAYED RELEASE ORAL at 09:55

## 2022-08-09 RX ADMIN — DOCUSATE SODIUM 100 MG: 100 CAPSULE, LIQUID FILLED ORAL at 09:56

## 2022-08-09 RX ADMIN — ONDANSETRON 4 MG: 2 INJECTION INTRAMUSCULAR; INTRAVENOUS at 00:46

## 2022-08-09 RX ADMIN — OXYCODONE HYDROCHLORIDE 10 MG: 5 TABLET ORAL at 07:49

## 2022-08-09 NOTE — PROGRESS NOTES
Discharge order noted for today. Pt has been accepted to [facility] agency. Met with patient and his wife and are agreeable to the transition plan today. Transport has been arranged throughpt's wife. Patient's discharge summary and home health  orders have been forwarded to [facility] home health  agency. Updated bedside RN, Celestina Smith,  to the transition plan.   Discharge information has been documented on the AVS.       SORAIDA Miller RN  Care Management  Pager: [phone]

## 2022-08-09 NOTE — PROGRESS NOTES
Problem: Mobility Impaired (Adult and Pediatric)  Goal: *Acute Goals and Plan of Care (Insert Text)  Description: Physical Therapy Goals  Initiated 8/8/2022 and to be accomplished within 7 day(s)  1. Patient will move from supine to sit and sit to supine , scoot up and down, and roll side to side in bed with modified independence. 2.  Patient will transfer from bed to chair and chair to bed with modified independence using the least restrictive device. 3.  Patient will perform sit to stand with modified independence. 4.  Patient will ambulate with modified independence for 500 feet with the least restrictive device. 5.  Patient will ascend/descend 5 stairs with unilateral handrail(s) with modified independence. PLOF: Pt reporting he lives with wife in 2 story house with first floor set up with 5 ALLIE with handrails. Pt has rollator and wheelchair. Angelina PTA.  8/9/2022 0849 by Marianne Hernandez, PT  Outcome: Resolved/Met  8/9/2022 0849 by Marianne Hernandez, PT  Outcome: Progressing Towards Goal     PHYSICAL THERAPY TREATMENT AND DISCHARGE    Patient: Jose Luis Rudd (36 y.o. male)  Date: 8/9/2022  Diagnosis: Spondylolisthesis of lumbar region [M43.16]  HNP (herniated nucleus pulposus), lumbar [M51.26]  Spinal stenosis, lumbar region with neurogenic claudication [M48.062]  Lumbar stenosis [M48.061] <principal problem not specified>  Procedure(s) (LRB):  LEFT L3/4 TRANSFORAMINAL LUMBAR INTERBODY FUSION (TLIF)/REVISION INSTRUMENTATION/C-ARM/OLGA (N/A) 1 Day Post-Op  Precautions: Spinal    ASSESSMENT:  Pt cleared to participate in PT session, pt received semi-reclined in bed and agreeable to therapy session. Completing with OT to maximize safety and mobility. Angelina bed mobility with use of log roll. Reaching to feet to tie shoes, able to adhere to spinal precautions with shoe elevated on recliner.  Pt then standing with Angelina, ambulating with appropriate gait pattern, ascending/descending 8 steps with reciprocal gait pattern with R handrail. Returned to  sitting EOB. Pt positioned for comfort and educated to call for assist before getting up, pt verbalized understanding. Pt left with all needs met and call bell in reach. RN notified of position and participation. Pt safe for discharge home with support from wife as needed. PLAN:  Maximum therapeutic gains met at current level of care and patient will be discharged from physical therapy at this time. Rationale for discharge:  [x]     Goals Achieved  []     Plateau Reached  []     Patient not participating in therapy  []     Other:    Further Equipment Recommendations for Discharge:  N/A    AMPAC: At this time and based on an AM-PAC score of 24/24 no further PT is recommended upon discharge due to patient at baseline functional status. Recommend patient returns to prior setting with prior services. This AMPAC score should be considered in conjunction with interdisciplinary team recommendations to determine the most appropriate discharge setting. Patient's social support, diagnosis, medical stability, and prior level of function should also be taken into consideration. SUBJECTIVE:   Patient stated I am ready.     OBJECTIVE DATA SUMMARY:   Critical Behavior:  Neurologic State: Alert  Orientation Level: Oriented X4  Cognition: Appropriate decision making  Safety/Judgement: Awareness of environment, Fall prevention  Functional Mobility Training:  Bed Mobility:  Rolling: Modified independent  Supine to Sit: Modified independent     Scooting: Modified independent    Transfers:  Sit to Stand: Modified independent  Stand to Sit: Modified independent    Balance:  Sitting: Intact  Standing: Intact; Without support      Posture:   Posture (WDL): Within defined limits     Ambulation/Gait Training:  Distance (ft): 200 Feet (ft)  Assistive Device:  (none)  Ambulation - Level of Assistance: Modified independent    Stairs:  Number of Stairs Trained: 8  Stairs - Level of Assistance: Modified independent  Rail Use: Right       Pain:  Pain level pre-treatment: 5/10   Pain level post-treatment: 5/10   Pain Intervention(s):  Rest, Repositioning  Response to intervention: Nurse notified    Activity Tolerance:   Good     Please refer to the flowsheet for vital signs taken during this treatment. After treatment:   [] Patient left in no apparent distress sitting up in chair  [x] Patient left in no apparent distress in bed  [x] Call bell left within reach  [x] Nursing notified  [] Caregiver present  [] Bed alarm activated  [] SCDs applied      COMMUNICATION/EDUCATION:   [x]         Role of Physical Therapy in the acute care setting. [x]         Fall prevention education was provided and the patient/caregiver indicated understanding. [x]         Patient/family have participated as able and agree with findings and recommendations. []         Patient is unable to participate in plan of care at this time. []         Other:        Magali Herrera, PT   Time Calculation: 8 mins    [facility] [address] [address] AM-PAC® Basic Mobility Inpatient Short Form (6-Clicks) Version 2    How much HELP from another person does the patient currently need    (If the patient hasn't done an activity recently, how much help from another person do you think he/she would need if he/she tried?)   Total (Total A or Dep)   A Lot  (Mod to Max A)   A Little (Sup or Min A)   None (Mod I to I)   Turning from your back to your side while in a flat bed without using bedrails? [] 1 [] 2 [] 3 [x] 4   2. Moving from lying on your back to sitting on the side of a flat bed without using bedrails? [] 1 [] 2 [] 3 [x] 4   3. Moving to and from a bed to a chair (including a wheelchair)? [] 1 [] 2 [] 3 [x] 4   4. Standing up from a chair using your arms (e.g., wheelchair, or bedside chair)? [] 1 [] 2 [] 3 [x] 4   5. Walking in hospital room? [] 1 [] 2 [] 3 [x] 4   6.  Climbing 3-5 steps with a railing?+ [] 1 [] 2 [] 3 [x] 4   +If stair climbing cannot be assessed, skip item #6. Sum responses from items 1-5.

## 2022-08-09 NOTE — HOME CARE
Received home health referral for [facility] for SN,PT,Nima protocol. Discharge order noted for today; Verified demographics,explained [facility] services and answered all questions to both patient and his spouse Geno Emmanuel) ; Patient states he has DME:Rollator and WC ; [facility] referral processed to [facility] central Intake. MAHIN SEWELL.

## 2022-08-09 NOTE — DISCHARGE SUMMARY
Discharge/Transfer  Summary     Patient: Sara Blankenship MRN: [id number]  SSN: [id number]    YOB: 1960  Age: 64 y.o. Sex: male       Admit Date: 8/8/2022    Discharge Date: 8/9/2022      Admission Diagnoses: Spondylolisthesis of lumbar region [M43.16]  HNP (herniated nucleus pulposus), lumbar [M51.26]  Spinal stenosis, lumbar region with neurogenic claudication [M48.062]  Lumbar stenosis [M48.061]    Discharge Diagnoses:   Problem List as of 8/9/2022 Date Reviewed: 8/7/2022            Codes Class Noted - Resolved    Lumbar stenosis ICD-10-CM: M48.061  ICD-9-CM: 724.02  8/8/2022 - Present        S/P lumbar fusion ICD-10-CM: Z98.1  ICD-9-CM: V45.4  6/8/2016 - Present        Recurrent herniation of lumbar disc ICD-10-CM: M51.26  ICD-9-CM: 722.10  5/25/2016 - Present        S/P lumbar laminectomy ICD-10-CM: Z98.890  ICD-9-CM: V45.89  10/21/2015 - Present        Swelling of lower extremity ICD-10-CM: M79.89  ICD-9-CM: 729.81  10/21/2015 - Present        HNP (herniated nucleus pulposus), lumbar ICD-10-CM: M51.26  ICD-9-CM: 722.10  10/15/2015 - Present            Discharge Condition: Good      Surgery: LEFT L3/4 TRANSFORAMINAL LUMBAR INTERBODY FUSION (TLIF)/REVISION INSTRUMENTATION/C-ARM/OLGA:      Procedure(s) (LRB):  LEFT L3/4 TRANSFORAMINAL LUMBAR INTERBODY FUSION (TLIF)/REVISION INSTRUMENTATION/C-ARM/OLGA (N/A)       Hospital Course: benign      Disposition: home    Discharge Medications:   Current Discharge Medication List        START taking these medications    Details   oxyCODONE IR (Roxicodone) 5 mg immediate release tablet Take 1 Tablet by mouth every six (6) hours as needed for Pain for up to 7 days. Max Daily Amount: 20 mg.  Qty: 28 Tablet, Refills: 0    Associated Diagnoses: S/P lumbar fusion      cyclobenzaprine (FLEXERIL) 10 mg tablet Take 1 Tablet by mouth three (3) times daily as needed for Muscle Spasm(s).   Qty: 30 Tablet, Refills: 0           CONTINUE these medications which have NOT CHANGED    Details   atorvastatin (LIPITOR) 20 mg tablet Take 20 mg by mouth in the morning. !! DULoxetine (CYMBALTA) 30 mg capsule Take 30 mg by mouth in the morning. naproxen EC (NAPROSYN EC) 500 mg EC tablet Take 500 mg by mouth two (2) times daily (with meals). testosterone cypionate (DEPOTESTOTERONE CYPIONATE) 200 mg/mL injection by IntraMUSCular route every Wednesday. 100 mg every week      !! DULoxetine (CYMBALTA) 60 mg capsule TK ONE C PO QD WITH 30MG  Refills: 1      traZODone (DESYREL) 100 mg tablet nightly as needed. Takes 25 to 50 mg as needed      CLARITIN-D 12 HOUR 5-120 mg per tablet Take 1 Tab by mouth daily. Refills: 3      VYVANSE 40 mg capsule Take 40 mg by mouth daily. 3 days a week on work days  Refills: 0      MIRVASO 0.33 % gel daily. Refills: 0      fluticasone (CUTIVATE) 0.05 % topical cream TERESE A PEA-SIZED AMOUNT AA OF THE FACE D FOR 2 WEEKS  Refills: 2       !! - Potential duplicate medications found. Please discuss with provider. Follow-up Appointments   Procedures    FOLLOW UP VISIT Appointment in: Two Weeks     Standing Status:   Standing     Number of Occurrences:   1     Order Specific Question:   Appointment in     Answer:    Two Weeks       Signed By: Rainelle Kanner, MD     August 9, 2022

## 2022-08-09 NOTE — PROGRESS NOTES
Problem: Self Care Deficits Care Plan (Adult)  Goal: *Acute Goals and Plan of Care (Insert Text)  Outcome: Resolved/Met   OCCUPATIONAL THERAPY EVALUATION/DISCHARGE    Patient: Gerilyn Cockayne (77 y.o. male)  Date: 8/9/2022  Primary Diagnosis: Spondylolisthesis of lumbar region [M43.16]  HNP (herniated nucleus pulposus), lumbar [M51.26]  Spinal stenosis, lumbar region with neurogenic claudication [M48.062]  Lumbar stenosis [M48.061]  Procedure(s) (LRB):  LEFT L3/4 TRANSFORAMINAL LUMBAR INTERBODY FUSION (TLIF)/REVISION INSTRUMENTATION/C-ARM/OLGA (N/A) 1 Day Post-Op   Precautions:   Spinal  PLOF: Pt was MI with all activities     ASSESSMENT AND RECOMMENDATIONS:  Based on the objective data described below, the patient presents with close to baseline functional independence with good understanding of spinal precautions. Pt semi reclined in bed fully dressed at beginning of session. Pt and spouse educated on spinal precautions and use of AE/AD for independence and safety. Pt concerns about lifting AD >10 lb addressed (rollator, which pt will most likely not be using) as well as education on changing positions frequently during the day (sitting/standing, supine). Pt demonstrated ability to perform figure four for reaching feet and issued LH sponge for safety reaching feet in shower. Pt with no other concerns at this time. Skilled occupational therapy is not indicated at this time. Further Equipment Recommendations for Discharge: N/A    AMPAC: 24/24 At this time and based on an AM-PAC score of 24/24, no further OT is recommended upon discharge due to patient at baseline functional status. Recommend patient returns to prior setting with prior services. This AMPAC score should be considered in conjunction with interdisciplinary team recommendations to determine the most appropriate discharge setting.  Patient's social support, diagnosis, medical stability, and prior level of function should also be taken into consideration. SUBJECTIVE:   Patient stated I have to sit down when I perform surgeries.     OBJECTIVE DATA SUMMARY:     Past Medical History:   Diagnosis Date    ADD (attention deficit disorder)     Anxiety     Asthma     mild per pt    COVID-19 04/2022    Hypercholesteremia     Hyperlipidemia     Male hypogonadism     MRSA (methicillin resistant Staphylococcus aureus)     right forearm approximately 2007    Rosacea      Past Surgical History:   Procedure Laterality Date    HX CARPAL TUNNEL RELEASE      bilateral    HX HERNIA REPAIR  1996    inguinal hernia repair    HX LUMBAR DISKECTOMY  10/19/2015    Dr.KERNER gregg    HX LUMBAR LAMINECTOMY  10/19/2015    JACQUELINE DUDLEY    HX LUMBAR LAMINECTOMY  05/25/2016    HX MOHS PROCEDURES      bilateral 2x on the right, 1x on the left    UT ABDOMEN SURGERY PROC UNLISTED      denies 8/4/2022    UT SINUS SURGERY PROC UNLISTED  1998     Barriers to Learning/Limitations: None  Compensate with: visual, verbal, tactile, kinesthetic cues/model    Home Situation:   Home Situation  Home Environment: Private residence  # Steps to Enter: 5  Rails to Enter: Yes  Hand Rails : Bilateral  One/Two Story Residence: Two story, live on 1st floor  Living Alone: No  Support Systems: Spouse/Significant Other, Child(mac)  Patient Expects to be Discharged to[de-identified] Home  Current DME Used/Available at Home: Shower chair  Tub or Shower Type: Shower  [x]     Right hand dominant   []     Left hand dominant    Cognitive/Behavioral Status:  Neurologic State: Alert  Orientation Level: Oriented X4  Cognition: Appropriate decision making; Follows commands       Skin: intact  Edema: none noted    Vision/Perceptual:       intact       Coordination: BUE  Coordination: Within functional limits  Fine Motor Skills-Upper: Left Intact; Right Intact    Gross Motor Skills-Upper: Left Intact; Right Intact    Balance:  Sitting: Intact  Standing: Intact; Without support    Strength: BUE    Strength:  Within functional limits   Tone & Sensation: BUE    Tone: Normal  Sensation: Intact      Range of Motion: BUE    AROM: Within functional limits     Functional Mobility and Transfers for ADLs:  Bed Mobility:  Rolling: Modified independent  Supine to Sit: Modified independent  Scooting: Modified independent  Transfers:  Sit to Stand: Modified independent  Stand to Sit: Modified independent     ADL Assessment:  Feeding: Independent  Oral Facial Hygiene/Grooming: Independent  Bathing: Modified independent  Upper Body Dressing: Modified independent  Lower Body Dressing: Modified independent  Toileting: Modified independent   Pain:  Pain level pre-treatment: 0/10   Pain level post-treatment: 0/10   Pain Intervention(s): Medication (see MAR); Rest, Ice, Repositioning   Response to intervention: Nurse notified, See doc flow    Activity Tolerance:   good  Please refer to the flowsheet for vital signs taken during this treatment. After treatment:   []  Patient left in no apparent distress sitting up in chair  [x]  Patient left in no apparent distress in bed  [x]  Call bell left within reach  [x]  Nursing notified  [x]  Caregiver present  []  Bed alarm activated    COMMUNICATION/EDUCATION:   [x]      Role of Occupational Therapy in the acute care setting  [x]      Home safety education was provided and the patient/caregiver indicated understanding. [x]      Patient/family have participated as able and agree with findings and recommendations. []      Patient is unable to participate in plan of care at this time. Thank you for this referral.  Tre Walker, OT  Time Calculation: 10 mins      Eval Complexity: History: LOW Complexity : Brief history review ; Examination: LOW Complexity : 1-3 performance deficits relating to physical, cognitive , or psychosocial skils that result in activity limitations and / or participation restrictions ;    Decision Making:LOW Complexity : No comorbidities that affect functional and no verbal or physical assistance needed to complete eval tasks     Maryanne Ramos AM-PAC® Daily Activity Inpatient Short Form (6-Clicks)*    How much HELP from another person does the patient currently need    (If the patient hasn't done an activity recently, how much help from another person do you think he/she would need if he/she tried?)   Total (Total A or Dep)   A Lot  (Mod to Max A)   A Little (Sup or Min A)   None (Mod I to I)   Putting on and taking off regular lower body clothing? [] 1 [] 2 [] 3 [x] 4   2. Bathing (including washing, rinsing,      drying)? [] 1 [] 2 [] 3 [x] 4   3. Toileting, which includes using toilet, bedpan or urinal?   [] 1 [] 2 [] 3 [x] 4   4. Putting on and taking off regular upper body clothing? [] 1 [] 2 [] 3 [x] 4   5. Taking care of personal grooming such as brushing teeth? [] 1 [] 2 [] 3 [x] 4   6. Eating meals?    [] 1 [] 2 [] 3 [x] 4

## 2022-08-09 NOTE — PROGRESS NOTES
Reason for Admission:  Spondylolisthesis of lumbar region [M43.16]  HNP (herniated nucleus pulposus), lumbar [M51.26]  Spinal stenosis, lumbar region with neurogenic claudication [M48.062]  Lumbar stenosis [M48.061]                 RUR Score:    3           Plan for utilizing home health:    yes                       Likelihood of Readmission:   LOW                         Transition of Care Plan:              Initial assessment completed with patient. Cognitive status of patient: oriented to time, place, person and situation. Face sheet information confirmed:  yes. The patient designates his wife Eliud Garcia  to participate in his discharge plan and to receive any needed information. This patient lives in a home with wife. Patient is able to navigate steps as needed. Prior to hospitalization, patient was considered to be independent with ADLs/IADLS : yes . Patient has a current ACP document on file: no      Healthcare Decision Maker:     Click here to complete [employer] including selection of the Healthcare Decision Maker Relationship (ie \"Primary\")    The wife will be available to transport patient home upon discharge. The patient already has [employer], Walker, Rollator, W/C, Grab bars, and raisedtoilet seat medical equipment available in the home. Patient is not currently active with home health. Patient has not stayed in a skilled nursing facility or rehab. Was  stay within last 60 days : no. This patient is on dialysis :no    List of available Home Health agencies were provided and reviewed with the patient prior to discharge. Freedom of choice signed: yes, for [facility]. Currently, the discharge plan is Home with [facility]. The patient states that he can obtain his medications from the pharmacy, and take his medications as directed. Patient's current insurance is [facility] [employer]  PCP Verified by CM:  Yes  Palliative Care Criteria Met (RRAT>21 & CHF Dx)?: No  Mode of Transport at Discharge:  Other (see comment) (family)  Transition of Care Consult (CM Consult): Discharge Planning, [address]: Yes  Physical Therapy Consult: Yes  Occupational Therapy Consult: Yes  Support Systems: Spouse/Significant Other  Confirm Follow Up Transport: Family  The Patient and/or Patient Representative was Provided with a Choice of Provider and Agrees with the Discharge Plan?: Yes  Freedom of Choice List was Provided with Basic Dialogue that Supports the Patient's Individualized Plan of Care/Goals, Treatment Preferences and Shares the Quality Data Associated with the Providers?: Yes  Discharge Location  Patient Expects to be Discharged to[de-identified] Home with home health        SORAIDA Crockett RN  Care Management  Pager: [phone]

## 2022-08-09 NOTE — DISCHARGE INSTRUCTIONS
Dr. Guzmán Cos    DO NOT take any NSAIDS if you had Fusion Surgery. ACTIVITIES:  *The first week after surgery   Change positions every hour while you are awake. Walking is the best way to rebuild strength. Activities around the house, such as washing dishes and preparing light meals are fine. Avoid strenuous activities, such as vacuuming, and do not lift anything heavier than 1 gallon of milk (or about 5-8 pounds). Do not bend over to  items from the ground level until 3 months post-op. *Week 2 and beyond  You may gradually increase your activities, but avoid heavy lifting, pushing/pulling. Walk at a pace that avoids fatigue or severe pain. Do not try to walk several blocks the first day! As you increase the distance, you may feel tired. If so, stop and rest.   Follow-up with Dr. Bernard Mahoney will be 2 weeks after surgery. BATHING and INCISION CARE:  The incision may be tender or feel numb: this is normal.   Keep the incision clean and dry. You may shower 3 days after surgery. Cover the dressing with saran wrap before getting in the shower. The incision is closed with sutures under the skin and glue on top. Do not apply any lotions, ointments or oils on the incision. Do not remove the dressing. Your dressing will be changed at your first post op appointment. If it comes loose or is damaged, dirty or wet before this appointment, call your home health nurse (if you are being seen by a nurse at home) or the office to have the dressing changed. If you notice any excessive swelling, redness, or persistent drainage around the incision, notify the office immediately. CONSTIPATION:  Take a stool softener twice a day while you are taking a narcotic.    If you have not had a bowel movement within 3 days of surgery, you will need to use a laxative or suppository that can be obtained over the counter at your local pharmacy     ICE  Use ice on your back to decrease pain and swelling. Do NOT use heat. MEDICATIONS:  If you had fusion surgery DO NOT TAKE non-steroidal anti-inflammatory (NSAID) medications, such as Motrin, Aleve, Advil Naprosyn, Ibuprofen or aspirin. Take Tylenol/Acetaminophen every 4-6 hours for pain. Do not take more than 3000 mg each day. (Do not take Tylenol/Acetaminophen if you have liver problems). Take your prescribed narcotic pain medication as needed for pain that is not tolerable. Eat food before you take any pain medication to avoid nausea. If you need a medication refill, please call the office during working hours at least 2 days before your prescription runs out. Do not wait until your bottle is empty to call for a refill. NUTRITION:  Eat healthy to help your wound to heal.    Eat a healthy balanced diet to help your wound to heal. Protein supplements should be considered if you are eating less than 50% of your meal.   Drink plenty of water to stay hydrated. DRIVING & RETURN TO WORK:   You will be told at your follow up appointment if it is safe for you to drive or return to work and will be provided with a [id number]-note if needed (please ask). NEVER drive while taking narcotic medication. WHEN TO CALL THE OFFICE:  If you have severe pain unrelieved by the medications, new numbness or tingling in your legs; If you have a fever of 101.0°F or greater   If you notice increased swelling, redness, or increased drainage from the incision    If you are not able to urinate  If you are not able to control your bowels       [address] [place] [address] number is [phone]. They are open from 8:00am to 5:00pm Mon - Fri. After 5:00pm, or on weekends/holidays, please call the answering service at [phone] for a call back.    DISCHARGE SUMMARY from Nurse    PATIENT INSTRUCTIONS:    After general anesthesia or intravenous sedation, for 24 hours or while taking prescription Narcotics:  Limit your activities  Do not drive and operate hazardous machinery  Do not make important personal or business decisions  Do  not drink alcoholic beverages  If you have not urinated within 8 hours after discharge, please contact your surgeon on call. Report the following to your surgeon:  Excessive pain, swelling, redness or odor of or around the surgical area  Temperature over 100.5  Nausea and vomiting lasting longer than 4 hours or if unable to take medications  Any signs of decreased circulation or nerve impairment to extremity: change in color, persistent  numbness, tingling, coldness or increase pain  Any questions    What to do at Home:  Recommended activity: Activity as tolerated, See 's Discharge instructions. If you experience any of the following symptoms ,See 's discharge instructions, please follow up with Ivanna Woodard. *  Please give a list of your current medications to your Primary Care Provider. *  Please update this list whenever your medications are discontinued, doses are      changed, or new medications (including over-the-counter products) are added. *  Please carry medication information at all times in case of emergency situations. These are general instructions for a healthy lifestyle:    No smoking/ No tobacco products/ Avoid exposure to second hand smoke  Surgeon General's Warning:  Quitting smoking now greatly reduces serious risk to your health. Obesity, smoking, and sedentary lifestyle greatly increases your risk for illness    A healthy diet, regular physical exercise & weight monitoring are important for maintaining a healthy lifestyle    You may be retaining fluid if you have a history of heart failure or if you experience any of the following symptoms:  Weight gain of 3 pounds or more overnight or 5 pounds in a week, increased swelling in our hands or feet or shortness of breath while lying flat in bed.   Please call your doctor as soon as you notice any of these symptoms; do not wait until your next office visit. Patient armband removed and shredded. [employer] Activation    Thank you for requesting access to [employer]. Please follow the instructions below to securely access and download your online medical record. [employer] allows you to send messages to your doctor, view your test results, renew your prescriptions, schedule appointments, and more. How Do I Sign Up? In your internet browser, go to [email or web address]. Click on the First Time User? Click Here link in the Sign In box. You will see the New Member Sign Up page. Enter your [employer] Access Code exactly as it appears below. You will not need to use this code after youve completed the sign-up process. If you do not sign up before the expiration date, you must request a new code. [employer] Access Code: Activation code not generated  Current [employer] Status: Active (This is the date your [employer] access code will )    Enter the last four digits of your Social Security Number (xxxx) and Date of Birth (mm/dd/yyyy) as indicated and click Submit. You will be taken to the next sign-up page. Create a [employer] ID. This will be your [employer] login ID and cannot be changed, so think of one that is secure and easy to remember. Create a [employer] password. You can change your password at any time. Enter your Password Reset Question and Answer. This can be used at a later time if you forget your password. Enter your e-mail address. You will receive e-mail notification when new information is available in [address] Click Sign Up. You can now view and download portions of your medical record. Click the [place] link to download a portable copy of your medical information. Additional Information    If you have questions, please visit the Frequently Asked Questions section of the [employer] website at [email or web address] Remember, MyChart is NOT to be used for urgent needs. For medical emergencies, dial 911. The discharge information has been reviewed with the patient. The patient verbalized understanding. Discharge medications reviewed with the patient and appropriate educational materials and side effects teaching were provided.   ___________________________________________________________________________________________________________________________________

## 2022-08-09 NOTE — NURSE NAVIGATOR
Rounded on patient s/p L3-4 left laminectomy, facetectomy, foraminotomy, diskectomy, resection of far lateral disk herniation. L3-4 transforaminal lumbar interbody fusion with expandable titanium cage, [place] type, i-Factor and autograft. Posterolateral fusion L3-4 with i-Factor and autograft. Segmental spinal instrumentation L3, L4, L5 with [place] screws and rods. Removal of hardware, L4-5., dos 08/08/2022. Patient observed to be alert and oriented x 3 , sitting up in bed. Patient denies chest pain, shortness of breath, nausea, vomiting, fever or chills. He is neurologically intact, denies numbness or pain to lower extremities. His pain is well controlled at present, rating pain at 3/10 to lumbar region. Dressing to lower back noted at clean, dry and intact. Vamsi drain has been removed. Ice applied. Per patient he has been up ambulating independently to the restroom , voiding without difficulty. A spine education book was provided to the patient along with education regarding the importance of hourly repositioning and icing to assist with pain. Patient verbalized understanding of all information provided. All questions answered. Anticipate discharge home today with home health and home PT. Will follow up post op.

## 2022-08-10 ENCOUNTER — TELEPHONE (OUTPATIENT)
Dept: OTHER | Age: 62
End: 2022-08-10

## 2022-08-10 ENCOUNTER — HOME CARE VISIT (OUTPATIENT)
Dept: SCHEDULING | Facility: HOME HEALTH | Age: 62
End: 2022-08-10
Payer: COMMERCIAL

## 2022-08-10 VITALS
SYSTOLIC BLOOD PRESSURE: 112 MMHG | HEIGHT: 70 IN | HEART RATE: 107 BPM | WEIGHT: 194 LBS | TEMPERATURE: 99.6 F | DIASTOLIC BLOOD PRESSURE: 74 MMHG | RESPIRATION RATE: 18 BRPM | BODY MASS INDEX: 27.77 KG/M2 | OXYGEN SATURATION: 95 %

## 2022-08-10 PROCEDURE — G0151 HHCP-SERV OF PT,EA 15 MIN: HCPCS

## 2022-08-10 PROCEDURE — 400013 HH SOC

## 2022-08-10 NOTE — Clinical Note
Start of care with home care agency was done today for post op lumbar spine surgery PT protocol. SN malathi to follow.

## 2022-08-10 NOTE — Clinical Note
Dx:  s/p L L3/L4 TLIF by Dr Aryan Lou. PREC:  post op lumbar spine prec., tend to be impulsive so needs reminders to comply with back prec and proper body mechanics during ADLs. Doing well and did a 6MWT. Needs to work on ambulation endurance training by doing 1 minute daily increments 2x/day up to 15 minutes. Then do once a day endurance walk starting 16 minutes then 1 minute daily increments up to 30 minutes. Therex:  standing position only = alternating legs for AROM BLE 15-20 reps with B hand support. Progress to one hand support, then no hand support. Review proper body mechanics on bed mobility. Change body position every 30 minutes when awake, no prolonged sitting, no lifting >8 lbs. Goals:  FTSTS, TUG.

## 2022-08-10 NOTE — TELEPHONE ENCOUNTER
Call placed to patient, Id verified x 2 patient s/p L3-4 left laminectomy, facetectomy, foraminotomy, diskectomy, resection of far lateral disk herniation. L3-4 transforaminal lumbar interbody fusion with expandable titanium cage, Alexandria type, i-Factor and autograft. Posterolateral fusion L3-4 with i-Factor and autograft. Segmental spinal instrumentation L3, L4, L5 with Alexandria screws and rods. Removal of hardware, L4-5., dos 08/08/2022. Patient denies chest pain, shortness of breath, nausea, vomiting, fever or chills. He states that his pain is well controlled at present taking his pain medications scheduled as prescribed. His dressing is described as clean, dry and intact. Per patient he is issues with the area where his drain was pulled with drainage. He has reinforced the dressing himself. He has not head from home health or home PT. Call placed to home health, they will reach out to patient today. He denies any numbness or tingling to lower extremities, in fact he reports a vast improvement. He is ambulating ad ariana without difficulties and overall feels he is doing very well. He has no questions or concerns at this time. He will follow up with Dr. Aiden Gan in two weeks for his post op visit. He will call if he does not here from home health by the end of the day.

## 2022-08-11 ENCOUNTER — HOME CARE VISIT (OUTPATIENT)
Dept: SCHEDULING | Facility: HOME HEALTH | Age: 62
End: 2022-08-11
Payer: COMMERCIAL

## 2022-08-11 VITALS
OXYGEN SATURATION: 98 % | TEMPERATURE: 99 F | DIASTOLIC BLOOD PRESSURE: 88 MMHG | SYSTOLIC BLOOD PRESSURE: 134 MMHG | RESPIRATION RATE: 17 BRPM | HEART RATE: 55 BPM

## 2022-08-11 VITALS
RESPIRATION RATE: 18 BRPM | HEART RATE: 80 BPM | SYSTOLIC BLOOD PRESSURE: 132 MMHG | DIASTOLIC BLOOD PRESSURE: 80 MMHG | TEMPERATURE: 97.3 F | OXYGEN SATURATION: 97 %

## 2022-08-11 PROCEDURE — G0157 HHC PT ASSISTANT EA 15: HCPCS

## 2022-08-11 PROCEDURE — G0299 HHS/HOSPICE OF RN EA 15 MIN: HCPCS

## 2022-08-11 NOTE — HOME HEALTH
PT malathi.    Pt is a 65 y/o male who is  s/p LEFT L3/4 TRANSFORAMINAL LUMBAR INTERBODY FUSION (TLIF) on 8/8/22 by Dr Jennie Krishnamurthy. Pt was discharged to home on 8/9/22  and is now referred to home care PT due to decreased gait. PMH:  ADD (attention deficit disorder); Anxiety; Asthma mild per pt; COVID-19 04/2022; Hypercholesteremia; Hyperlipidemia; Male hypogonadism; MRSA (methicillin resistant Staphylococcus aureus) right forearm approximately 2007; Rosacea. Personal History of prior back and shoulder surgeries. PLOF/living environment:  ind and ambulatory;  works as an EENT physician;  lives with wife in a 2 story home with bedroom and shower in first floor. PREC: post op lumbar spine fusion/back precautions = no bending, no lifting and no twisting; proper body mechanics; fall risk;    S:  Pt. denies  falls since coming home. Pt's goals in PT are  to get stronger and walk pain free. O: Pain:  lower back  =  6-8  /10 at rest.   Integumentary:  lumbar spine incision with opsite honeycomb dressing with 25 % bloody saturation. Dressing change:  using clean technique, removed saturated opsite dressing. Lumbar spine incision is approximated with intact prineo tape;   applied sterile Mepilex dressing. Drain site is dry and was covered with Band-aid. An extra Mepilex dressing was left with pt so his wife can change incision dressing if it gets saturated. Wife was present and was given demonstration on clean technique during incision dressing change. Has SN order and pt was informed that SN will primarily address his incision care. ROM:  BLE = WNL    MMT:  BLE = 3+ to 4/5    Bed  mobility:  sit <> supine =  SBA with verbal cues for proper body mechanics  ;  rolling = SBA with verbal cues on log roll technique;    Transfers:  sit <> stand from toilet SBA;  sit <>stand from bed = SBA;  sit <> stand from lift recliner chair:  SBA.   Pt instructed on avoiding sitting on regular/non power recliner chair at this time .  TUG = 11.3 sec, no AD;  indicating min fall risk. FTSTS:  First attempt unable due to back pain;  second attempt = 20 seconds, indicating decreased BLE functional strength. 6MWT: 300 ft., no AD, indoor. O2 sats = 99%;  HR = 107 bpm and needs <5 minutes seated rest.    Gait:  300  ft  SBA  using no AD exhibiting  good gait pattern. Pt instruction: arm swing and good uprght trunk posture when walking and standing. Patient education:   Fall risk reduction strategies = use non skid shoes and use long handled shoe horn for donning/doffing shoes,  ask for supervision. Maintenance of skin integrity:  change body position every 30 minutes  Review back precautions:  No bending, no lifting >8lbs, no twisting;  do proper body mechanics during bed mobility (log roll tech), transfers and gait. Physical activity:  Ambulate once every hour  with supervision, during daytime. Endurance walk:  start at 6 minutes 2x/day, then add 1 minute daily increments up to 15 minutes; Then do once a day with 1 minute daily increments up to 30 minutes. Pain control:  Pt. instructed to take oxycodone and flexeril as  prescribed by doctor. Ice x 20 mins on and 40 mins off on lower back, apply skin barrier to maintain skin integrity. Patient level of understanding of education provided: Pt verbalizes need to do his walking program to promote back healing and overall strengthening. Patient response to treatment:  Pain during transitional movements. Caregiver involvement/assistance needed: wife Marie Gonsales  (name of caregiver) assists with self care, ADLs, iADLs, functional mobility, transportation. A:  Pt is s/p LEFT L3/4 TRANSFORAMINAL LUMBAR INTERBODY FUSION (TLIF)   and presents with the following functional impairments:  decreased gait, decreased bed mobility and transfers.    Pt will benefit from PT to improve strength , balance and endurance to promote safe functional mobility in pt's environment  and will be ind in self management of pain,  in post op back precautions and in maintaining safety in the home. P:  PT frequency 2w1, 3w1, 1w1  for post op back HEP, review of back precautions, fall risk reduction strategies, bed mob trng, transfer trng, gait trng. PT/LPTA to perform lumbar spine dressing changes using clean technique per protocol by Dr. Jason Stevens:  Next lumbar incision dressing change is on 8/15/22. If nurse is unavailable, therapist to remove lumbosacral opsite visible dressing on , using clean technique followed by Cover with Dry sterile dressing such as opsite visible dressing per protocol by Dr. Gomez People: The waterproof dressing can stay in place for 1 week unless it becomes saturated or soiled. Change as needed. If waterproof dressing is not available the wound may be dressed with nonstick gauze and tape. F/u visit with Dr. Jason Stevens 8/23/22. Dr. Jason Stevens was informed via EPIC regarding start of care with agency for PT. SN eval to follow. SOC book:  [facility] selected representative identified as spouse Rajani Fernández. Obtained authorization for treatment and viewed insurance cards for accuracy. Patient handbook was provided and reviewed with pt/CG. [place] [employer] of rights and advanced directive information reviewed. Instructed on Agency 24 hour on call system and when to call 911 vs MD vs agency for changes in pt condition. Instructed in and promoted pt/CG involvement in plan of care. Emergency preparations done using information in admission booklet. Medication reconciled and updated and no issues noted. Discussed correct dosage and administration of high risk medication: Oxycodone: Instructed patient/caregiver to take exact amount prescribed, signs and symptoms of oversedation, notify SN/PT if oversedated. May cause constipation, notify SN/PT if no BM x 3 days. Informed Rehab clinical manager of PT frequency.

## 2022-08-11 NOTE — HOME HEALTH
Skilled reason for visit: DISEASE MED MANAGEMENT, PHYSICAL ASSESSMENT, VITAL SIGNS, WOUND CARE    Caregiver involvement: family, iadls, adls, meal prep, med management, taking to md appointments. Medications reviewed and all medications are available in the home this visit. The following education was provided regarding medications:  patient/cg reminded to continue to take medications as prescribed. patient aware to monitor for effectiveness and to notify staff of any adverse reactions to medications/any changes to medication regimen. .    MD notified of any discrepancies/look a-like medications/medication interactions: NA  Medications are EFFECTIVE at this time. Controlled substance OXYCODONE  High risk for addiction and dependence. Can cause respiratory distress and death when taken in high doses or when combined with other substances, especially alcohol or other illicit drugs such as heroin or cocaine. Home health supplies by type and quantity ordered/delivered this visit include: NA    Patient education provided this visit: encouraged patient to get three nutritional meals daily and to stay hydrated, drink plenty of fluids. patient made aware to monitor for s/s of infection [increased swelling, increased redness around site, increased pain, foul smelling drainage, fever] aware who to report to/when. Sharps education provided: NA    Patient level of understanding of education provided: PATIENT/CG  WAS ABLE TO REPEAT BACK AND VOICED UNDERSTANDING OF ALL EDUCATION PROVIDED.     Skilled Care Performed this visit: SAME AS REASON FOR VISIT    Patient response to procedure performed:   PATIENT TOLERATED WELL WITH NO C/O OF INCREASED PAIN OR DISCOMFORT    Agency Progress toward goals: PROGRESSING     Patient's Progress towards personal goals: PROGRESSING    Home exercise program: PATIENT TO TAKE ALL MEDS AS ORDERED, DO ICS X10/HR WHILE AWAKE, DRINK PLENTY OF FLUIDS,    Continued need for the following skills: Nursing  Plan for next visit, dressing change  Patient will be discharged once education has completed, patient is medically stable and pt/cg are able to independently manage wound care/ wound has healed or no longer requires skilled care.

## 2022-08-12 ENCOUNTER — HOME CARE VISIT (OUTPATIENT)
Dept: HOME HEALTH SERVICES | Facility: HOME HEALTH | Age: 62
End: 2022-08-12
Payer: COMMERCIAL

## 2022-08-12 NOTE — HOME HEALTH
SUBJECTIVE: Pt states that he is doing well today, notes that he has no new complaints or concerns. Reports that he has been in a lot of pain since the surgery, noting pain at an 8/10 at this moment. Denies any falls or changes in medication at this time. CAREGIVER INVOLVEMENT/ASSISTANCE NEEDED FOR: Pt lives in a two story home with four stairs to enter and exit home. He lives here with his wife who he relies on for most to all ADL's such as cooking, cleaning, bathing and dressing. OBJECTIVE:  See interventions. PATIENT RESPONSE TO TREATMENT:  Pt with good reports to treatment today, in good spirits post PT. Seems well motivated and ready to return to prior level of function. PATIENT LEVEL OF UNDERSTANDING OF EDUCATION PROVIDED: Pt and caregiver educated on importance of maintaining low back precautions throughout exercises and his ADL's. Educated on importance of HEP and activation of TA during to protect spine. Educated pt and caregiver on signs and sx of infection and steps to take if one were to arise. Both pt and caregiver verbalized understanding of all above. ASSESSMENT OF PROGRESS TOWARD GOALS: Pt was seen for PT follow up session for education on importance of LB precautions, sanding therex, and gait and stair training. Pt is very high energy and often needs reminding of his low back precautions, MOD cuing needed. Standing therex began today with pt demonstrating ability to complete 20 reps without rest break, he needs cuing for activation of TA. Pt demonstrating MOD I with bed mobility, sit to sand transfers, gait inside of home and stairs. HOME EXERCISE PROGRAM: All therex completed today with use of TA brace   standing therex hip marches, hip abd, heel raises, glut squeezes mini squats x20 with cuing needed for activation of TA and one demonstration required for correct completion.     THE FOLLOWING DISCHARGE PLANNING WAS DISCUSSED WITH THE PATIENT/CAREGIVER: Anticipate D/C from [facility] PT when all goals have been met or progressed well towards. PLAN FOR NEXT VISIT: Cont with LE strengthening and assess outdoor gait.

## 2022-08-15 ENCOUNTER — HOME CARE VISIT (OUTPATIENT)
Dept: SCHEDULING | Facility: HOME HEALTH | Age: 62
End: 2022-08-15
Payer: COMMERCIAL

## 2022-08-15 VITALS
SYSTOLIC BLOOD PRESSURE: 122 MMHG | TEMPERATURE: 98.7 F | DIASTOLIC BLOOD PRESSURE: 80 MMHG | HEART RATE: 99 BPM | OXYGEN SATURATION: 99 %

## 2022-08-15 PROCEDURE — G0157 HHC PT ASSISTANT EA 15: HCPCS

## 2022-08-16 NOTE — HOME HEALTH
SUBJECTIVE: Pt states that he is doing much better today than the previous session. Notes lower pain levels and increased sleeping pattern since the previous session. Denies any falls or changes in medication today. CAREGIVER INVOLVEMENT/ASSISTANCE NEEDED FOR: Pt lives in a two story home with four stairs to enter and exit home. He lives here with his wife who he relies on for most to all ADL's such as cooking, cleaning, bathing and dressing. OBJECTIVE:  See interventions. PATIENT RESPONSE TO TREATMENT:  Pt with evident fatigue following todays session, in good spirts still and continues to remain well motivated. PATIENT LEVEL OF UNDERSTANDING OF EDUCATION PROVIDED: Pt and caregiver verbalized understanding of all above education. ASSESSMENT OF PROGRESS TOWARD GOALS: Pt was seen for PT follow up session for education on importance of LB precautions, sanding therex, and gait and stair training. Pt is very high energy and often needs reminding of his low back precautions. Is now able to recite them however and was more concious of them during todays session. Continued with gait training in [facility] portion of home, unable to go outside today due to wheather but expect next session to. Continued with therex in standing with activation of TA for low back protection. HOME EXERCISE PROGRAM: All therex completed today with use of TA brace standing therex hip marches, hip abd, heel raises, glut squeezes mini squats x20 with cuing needed for activation of TA and one demonstration required for correct completion. THE FOLLOWING DISCHARGE PLANNING WAS DISCUSSED WITH THE PATIENT/CAREGIVER: Anticipate D/C from [facility] PT when all goals have been met or progressed well towards. PLAN FOR NEXT VISIT: Cont with LE strengthening and assess outdoor gait.

## 2022-08-17 ENCOUNTER — HOME CARE VISIT (OUTPATIENT)
Dept: SCHEDULING | Facility: HOME HEALTH | Age: 62
End: 2022-08-17
Payer: COMMERCIAL

## 2022-08-17 VITALS
TEMPERATURE: 98 F | OXYGEN SATURATION: 98 % | RESPIRATION RATE: 18 BRPM | HEART RATE: 92 BPM | DIASTOLIC BLOOD PRESSURE: 72 MMHG | SYSTOLIC BLOOD PRESSURE: 112 MMHG

## 2022-08-17 VITALS
HEART RATE: 67 BPM | RESPIRATION RATE: 17 BRPM | SYSTOLIC BLOOD PRESSURE: 118 MMHG | OXYGEN SATURATION: 98 % | TEMPERATURE: 98.4 F | DIASTOLIC BLOOD PRESSURE: 80 MMHG

## 2022-08-17 PROCEDURE — G0157 HHC PT ASSISTANT EA 15: HCPCS

## 2022-08-17 PROCEDURE — G0299 HHS/HOSPICE OF RN EA 15 MIN: HCPCS

## 2022-08-17 NOTE — HOME HEALTH
Skilled reason for visit: DISEASE MED MANAGEMENT, PHYSICAL ASSESSMENT, VITAL SIGNS, DRESSING CHANGE, NO S/S OF INFECTION NOTED THIS VISIT    Caregiver involvement: family, iadls, adls, meal prep, med management, taking to md appointments. Medications reviewed and all medications are available in the home this visit. The following education was provided regarding medications:  patient/cg reminded to continue to take medications as prescribed. patient aware to monitor for effectiveness and to notify staff of any adverse reactions to medications/any changes to medication regimen. .      MD notified of any discrepancies/look a-like medications/medication interactions: NA    Medications are EFFECTIVE at this time. Controlled substance OXYCODONE    High risk for addiction and dependence. Can cause respiratory distress and death when taken in high doses or when combined with other substances, especially alcohol or other illicit drugs such as heroin or cocaine. Home health supplies by type and quantity ordered/delivered this visit include: NA    Patient education provided this visit: encouraged patient to get three nutritional meals daily and to stay hydrated, drink plenty of fluids. patient made aware to monitor for s/s of infection [increased swelling, increased redness around site, increased pain, foul smelling drainage, fever] aware who to report to/when. EDUCATED PATIENT/CG ON THE IMPORTANCE OF INCREASING FLUIDS WHILE ON PAIN MEDICAITON. AS ALL PAIN MEDS ARE VERY CONSTIPATING. MAY NEED TO TAKE ADDITIONAL MEDICATIONS FOR ASSISTANCE. CALL PCP FOR ORDERS TO TAKE MIRLAX, MILK OF MAGNESIUM, MAG CITRATE. PATIENT MAY ALSO DRINK PRUNE JUICE, ADD PRUNES TO DIET. Sharps education provided: NA    Patient level of understanding of education provided: PATIENT/CG  WAS ABLE TO REPEAT BACK AND VOICED UNDERSTANDING OF ALL EDUCATION PROVIDED.     Skilled Care Performed this visit: SAME AS REASON FOR VISIT    Patient response to procedure performed:   PATIENT TOLERATED WELL WITH NO C/O OF INCREASED PAIN OR DISCOMFORT    Agency Progress toward goals: PROGRESSING     Patient's Progress towards personal goals: PROGRESSING    Home exercise program: PATIENT TO TAKE ALL MEDS AS ORDERED, DO ICS X10/HR WHILE AWAKE, DRINK PLENTY OF FLUIDS,    Continued need for the following skills: Nursing    Plan for next visit, dressing change    Patient will be discharged once education has completed, patient is medically stable and pt/cg are able to independently manage wound care/ wound has healed or no longer requires skilled care. Well-developed, well nourished

## 2022-08-18 NOTE — HOME HEALTH
SUBJECTIVE: Pt states that he is doing fair today, notes that he has been experiencing more pain overall since the previous session. Notes that he has been feeling more swelling in low back then previously. Denies any falls or changes in medication since the previous session. CAREGIVER INVOLVEMENT/ASSISTANCE NEEDED FOR: Pt lives in a two story home with four stairs to enter and exit home. He lives here with his wife who he relies on for most to all ADL's such as cooking, cleaning, bathing and dressing. OBJECTIVE:  See interventions. PATIENT RESPONSE TO TREATMENT:  Pt in better spirits post PT today, encouraged by his tolerance to increased gait distance today. PATIENT LEVEL OF UNDERSTANDING OF EDUCATION PROVIDED: Encouraged pt to continue with CP usage for 20 mins on and 30 mins off for pain and swelling control. Pt reminded of low back precautions and importance of compliance with this. Pt and caregiver verbalized understanding of all above. ASSESSMENT OF PROGRESS TOWARD GOALS: Pt was seen for PT follow up for incision inspection and gait training outdoors on unlevel surfaces. Pt with more visable swelling today on insicsion than that of previous sessions. Encouraged pt to continue to use ice for pain and swelling control. Gait completed for half a mile outdoors without rest break required, pt notes this as the furthest he has walked since his surgery. HOME EXERCISE PROGRAM: All therex completed today with use of TA brace standing therex hip marches, hip abd, heel raises, glut squeezes mini squats x20 with cuing needed for activation of TA and one demonstration required for correct completion. THE FOLLOWING DISCHARGE PLANNING WAS DISCUSSED WITH THE PATIENT/CAREGIVER: Anticipate D/C from [facility] PT when all goals have been met or progressed well towards. PLAN FOR NEXT VISIT: Cont with LE strengthening and assess outdoor gait.

## 2022-08-19 ENCOUNTER — HOME CARE VISIT (OUTPATIENT)
Dept: HOME HEALTH SERVICES | Facility: HOME HEALTH | Age: 62
End: 2022-08-19
Payer: COMMERCIAL

## 2022-08-22 ENCOUNTER — HOME CARE VISIT (OUTPATIENT)
Dept: SCHEDULING | Facility: HOME HEALTH | Age: 62
End: 2022-08-22
Payer: COMMERCIAL

## 2022-08-22 VITALS
SYSTOLIC BLOOD PRESSURE: 139 MMHG | HEART RATE: 99 BPM | DIASTOLIC BLOOD PRESSURE: 86 MMHG | RESPIRATION RATE: 16 BRPM | OXYGEN SATURATION: 99 % | TEMPERATURE: 98.4 F

## 2022-08-22 PROCEDURE — G0151 HHCP-SERV OF PT,EA 15 MIN: HCPCS

## 2022-08-22 NOTE — Clinical Note
Pt is discharged from home care PT and agency because he has met his goals.   Thank you for this referral.

## 2022-08-23 NOTE — HOME HEALTH
PT post op back DC summary:  Pt is a 65 y/o  male who is s/p L L3-4 TLIF by Dr. Sindy Mcallister on  22. S:  Pt is doing well. He walked 30 minutes or 1.5 miles yesterday and he felt sore by the time he came home. He applied ice and took medications and he felt better afterwards. Pt denies falls and reported change in medications. O:  Medications reconciled and updated. PT home care eval date is 8/10/22. Pt  received 3x/wk PT sessions for post op back PT protocol  =strengthening, bed mob and transfer trng, gait trng, HEP, balance trng, fall risk reduction strategies, proper body mechanics, insts on back precautions. Integumentary: lumbar spine incision with intact, dry, clean opsite visible dressing. Incision is newly epithelialized. Collaborated with SN visit on 22. Goal met. Pt's current status :  Pain:  lower back = 0-6 / 10;  denies LE radiating pain;   Pt is ind in self management of pain. Goal achieved. Bed mobility:  sit <> supine = ind.  Goal achieved. Transfers:  sit <> stand from multiple surfaces including shower and car transfers = ind.  Goal achieved. Gait:  1.5 miles SBA using no AD exhibiting good gait pattern    Goal achieved. FTSTS (8/10/22):  20 sec  = indicative of Good BLE functional strength. Goal achieved. TU sec  = indicative of no fall risk. Goal achieved. 6 MWT:  walked 30 minutes, 1.5 miles yesterday  = indicative of good walking endurance. Goal achieved. Stairs negotiation for ingress/egress to home, all entries:  ind, step through, no hand support = Goal achieved. HEP:  ind using  HEP handout and pt has been doing exercises daily. Goal achieved. Patient DC instructions:  Continue doing  BLE exercises HEP and ambulate once every hour during daytime. Maintain once a day x 30 minute endurance walk  Patient level of understanding of education provided: ind and compliant. Patient response to treatment:  tolerated well.   Caregiver involvement/assistance needed: wife (name of caregiver) assists with transportation. A:  Patient demonstrated  significant functional gain evidenced by improved walking endurance and ind in all functional mobility. Pt has met PT goals in home care setting. P: DC PT and pt agreed. Pt was instructed to continue with HEP and ambulation program.  F/u with Dr Perez Sites is on 8/23/22.

## 2022-09-19 ENCOUNTER — HOSPITAL ENCOUNTER (OUTPATIENT)
Dept: GENERAL RADIOLOGY | Age: 62
Discharge: HOME | End: 2022-09-19
Payer: COMMERCIAL

## 2022-09-19 DIAGNOSIS — M54.50 LOW BACK PAIN: ICD-10-CM

## 2022-09-19 PROCEDURE — 72110 X-RAY EXAM L-2 SPINE 4/>VWS: CPT

## 2023-06-29 DIAGNOSIS — M79.644 FINGER PAIN, RIGHT: Primary | ICD-10-CM

## 2023-08-18 ENCOUNTER — HOSPITAL ENCOUNTER (OUTPATIENT)
Facility: HOSPITAL | Age: 63
Discharge: HOME | End: 2023-08-18
Payer: COMMERCIAL

## 2023-08-18 DIAGNOSIS — Z98.1 STATUS POST LUMBAR SPINAL FUSION: ICD-10-CM

## 2023-08-18 PROCEDURE — 72110 X-RAY EXAM L-2 SPINE 4/>VWS: CPT

## 2023-09-25 ENCOUNTER — HOSPITAL ENCOUNTER (OUTPATIENT)
Age: 63
Discharge: HOME | End: 2023-09-28
Payer: COMMERCIAL

## 2023-09-25 ENCOUNTER — TRANSCRIBE ORDERS (OUTPATIENT)
Age: 63
End: 2023-09-25

## 2023-09-25 DIAGNOSIS — R05.9 COUGH, UNSPECIFIED TYPE: Primary | ICD-10-CM

## 2023-09-25 DIAGNOSIS — R05.9 COUGH, UNSPECIFIED TYPE: ICD-10-CM

## 2023-09-25 PROCEDURE — 71046 X-RAY EXAM CHEST 2 VIEWS: CPT

## (undated) DEVICE — GAUZE,SPONGE,4"X4",16PLY,STRL,LF,10/TRAY: Brand: MEDLINE

## (undated) DEVICE — INTENDED FOR TISSUE SEPARATION, AND OTHER PROCEDURES THAT REQUIRE A SHARP SURGICAL BLADE TO PUNCTURE OR CUT.: Brand: BARD-PARKER SAFETY BLADES SIZE 15, STERILE

## (undated) DEVICE — REM POLYHESIVE ADULT PATIENT RETURN ELECTRODE: Brand: VALLEYLAB

## (undated) DEVICE — SOLUTION IV 1000ML 0.9% SOD CHL

## (undated) DEVICE — DRAIN SURG W7MMXL20CM SIL FULL PERF HUBLESS FLAT RADPQ STRP

## (undated) DEVICE — BIPOLAR FORCEPS CORD: Brand: VALLEYLAB

## (undated) DEVICE — SUTURE STRATAFIX SYMMETRIC PDS + ETHIGUARD SZ 1 L18IN ABSRB SXPP1A300

## (undated) DEVICE — TABLE COVER: Brand: CONVERTORS

## (undated) DEVICE — KIT CLN UP BON SECOURS MARYV

## (undated) DEVICE — DRAPE XR C ARM 41X74IN LF --

## (undated) DEVICE — JACKSON TABLE POSITIONER KIT: Brand: MEDLINE INDUSTRIES, INC.

## (undated) DEVICE — SUTURE VCRL SZ 2-0 L18IN ABSRB VLT CT-1 L36MM 1/2 CIR J739D

## (undated) DEVICE — TRAY,URINE METER,100% SILICONE,16FR10ML: Brand: MEDLINE

## (undated) DEVICE — BLANKET WRM AD W50XL85.8IN PACU FULL BODY FORC AIR

## (undated) DEVICE — SUTURE VCRL SZ 1 L18IN ABSRB VLT CTX L48MM 1/2 CIR J765D

## (undated) DEVICE — STOCKING COMPR L L16-18IN LNG 19MMHG ANK 9-10IN CALF

## (undated) DEVICE — GARMENT,MEDLINE,DVT,INT,CALF,MED, GEN2: Brand: MEDLINE

## (undated) DEVICE — INTENDED FOR TISSUE SEPARATION, AND OTHER PROCEDURES THAT REQUIRE A SHARP SURGICAL BLADE TO PUNCTURE OR CUT.: Brand: BARD-PARKER SAFETY BLADES SIZE 20, STERILE

## (undated) DEVICE — C-ARMOR C-ARM EQUIPMENT COVERS CLEAR STERILE UNIVERSAL FIT 12 PER CASE: Brand: C-ARMOR

## (undated) DEVICE — Device

## (undated) DEVICE — SUTURE STRATAFIX SPRL MCRYL + SZ 4-0 L12IN ABSRB UD PS-2 SXMP1B117

## (undated) DEVICE — WAX SURG 2.5GM HEMSTAT BNE BEESWAX PARAFFIN ISO PALMITATE

## (undated) DEVICE — SPIROMETER INCENT 2500ML W ONE W VLV

## (undated) DEVICE — GELFOAM SZ 100 SPNG

## (undated) DEVICE — 3.0MM PRECISION NEURO (MATCH HEAD)

## (undated) DEVICE — HEX-LOCKING BLADE ELECTRODE: Brand: EDGE

## (undated) DEVICE — ADHESIVE SKIN CLOSURE 4X22 CM PREMIERPRO EXOFINFUSION DISP

## (undated) DEVICE — SUTURE STRATAFIX SYMMETRIC PDS + SZ 2-0 L18IN ABSRB VLT SXPP1A403

## (undated) DEVICE — PAD NON-ADHERENT 3X4 STRL LF --

## (undated) DEVICE — PREP SKN CHLRAPRP APL 26ML STR --